# Patient Record
Sex: MALE | Race: WHITE | HISPANIC OR LATINO | Employment: OTHER | URBAN - NONMETROPOLITAN AREA
[De-identification: names, ages, dates, MRNs, and addresses within clinical notes are randomized per-mention and may not be internally consistent; named-entity substitution may affect disease eponyms.]

---

## 2020-07-08 ENCOUNTER — HISTORICAL (OUTPATIENT)
Dept: ADMINISTRATIVE | Facility: HOSPITAL | Age: 80
End: 2020-07-08

## 2020-07-12 LAB — SARS-COV-2 RNA RESP QL NAA+PROBE: NOT DETECTED

## 2021-01-13 ENCOUNTER — IMMUNIZATION (OUTPATIENT)
Dept: OBSTETRICS AND GYNECOLOGY | Facility: CLINIC | Age: 81
End: 2021-01-13
Payer: OTHER GOVERNMENT

## 2021-01-13 DIAGNOSIS — Z23 NEED FOR VACCINATION: ICD-10-CM

## 2021-01-13 PROCEDURE — 91300 COVID-19, MRNA, LNP-S, PF, 30 MCG/0.3 ML DOSE VACCINE: CPT | Mod: PBBFAC

## 2021-02-03 ENCOUNTER — IMMUNIZATION (OUTPATIENT)
Dept: OBSTETRICS AND GYNECOLOGY | Facility: CLINIC | Age: 81
End: 2021-02-03
Payer: OTHER GOVERNMENT

## 2021-02-03 DIAGNOSIS — Z23 NEED FOR VACCINATION: Primary | ICD-10-CM

## 2021-02-03 PROCEDURE — 91300 COVID-19, MRNA, LNP-S, PF, 30 MCG/0.3 ML DOSE VACCINE: CPT | Mod: PBBFAC

## 2021-02-03 PROCEDURE — 0002A COVID-19, MRNA, LNP-S, PF, 30 MCG/0.3 ML DOSE VACCINE: CPT | Mod: PBBFAC

## 2021-09-28 ENCOUNTER — IMMUNIZATION (OUTPATIENT)
Dept: OBSTETRICS AND GYNECOLOGY | Facility: CLINIC | Age: 81
End: 2021-09-28

## 2021-09-28 DIAGNOSIS — Z23 NEED FOR VACCINATION: Primary | ICD-10-CM

## 2021-09-28 PROCEDURE — 91300 COVID-19, MRNA, LNP-S, PF, 30 MCG/0.3 ML DOSE VACCINE: CPT | Mod: PBBFAC

## 2022-04-28 NOTE — DISCHARGE INSTRUCTIONS
BEFORE THE PROCEDURE:    REPORT ANY CHANGE IN YOUR PHYSICAL CONDITION TO YOUR DOCTOR IMMEDIATELY.  SELF ISOLATE AND CHECK TEMPERATURE DAILY, IF TEMP OVER 100, CALL PHYSICIAN IMMEDIATELY.   NO SMOKING OR ALCOHOL FOR 72 HOURS BEFORE YOUR PROCEDURE.   DO NOT EAT OR DRINK ANYTHING AFTER MIDNIGHT THE NIGHT BEFORE YOUR PROCEDURE    THE MORNING OF YOUR PROCEDURE:      YOU  WILL GET A PHONE CALL THE DAY BEFORE YOUR PROCEDURE WITH YOUR APPOINTED TIME   NO MAKE UP OR NAIL POLISH.   ALL MINORS MUST BE ACCOMPANIED BY AN ADULT AT ALL TIMES.     TAKE A SHOWER/BATH THE NIGHT BEFORE YOUR PROCEDURE.     TAKE BLOOD PRESSURE MEDICATIONS THE MORNING OF YOUR PROCEDURE, WITH SMALL SIPS WATER, AS DIRECTED BY YOUR PHYSICIAN.   DO NOT TAKE ANY DIABETIC MEDICATIONS UNLESS DIRECTED TO DO SO BY YOUR PHYSICIAN.   CONTACT LENSES AND DENTURES MUST BE REMOVED.  A RESPONSIBLE ADULT MUST ACCOMPANY YOU HOME UPON DISCHARGE.   ONLY 1 VISITORS ALLOWED PER ROOM.   BRING YOUR EYE DROPS        YOUR THOUGHTS AND OPINIONS HELP US TO BETTER SERVE YOU.     PLEASE PARTICIPATE IN SURVEYS ABOUT YOUR CARE.     THANK YOU FOR CHOOSING [facility].

## 2022-04-29 ENCOUNTER — ANESTHESIA EVENT (OUTPATIENT)
Dept: SURGERY | Facility: HOSPITAL | Age: 82
End: 2022-04-29
Payer: MEDICARE

## 2022-04-29 VITALS — BODY MASS INDEX: 21.03 KG/M2 | WEIGHT: 142 LBS | HEIGHT: 69 IN

## 2022-04-29 PROBLEM — H11.051: Status: ACTIVE | Noted: 2022-04-29

## 2022-04-29 RX ORDER — ATORVASTATIN CALCIUM 40 MG/1
40 TABLET, FILM COATED ORAL DAILY
COMMUNITY

## 2022-04-29 RX ORDER — LISINOPRIL 20 MG/1
20 TABLET ORAL 2 TIMES DAILY
COMMUNITY

## 2022-05-02 ENCOUNTER — HOSPITAL ENCOUNTER (OUTPATIENT)
Dept: PREADMISSION TESTING | Facility: HOSPITAL | Age: 82
Discharge: HOME | End: 2022-05-02
Attending: OPHTHALMOLOGY
Payer: MEDICARE

## 2022-05-03 ENCOUNTER — ANESTHESIA (OUTPATIENT)
Dept: SURGERY | Facility: HOSPITAL | Age: 82
End: 2022-05-03
Payer: MEDICARE

## 2022-05-03 ENCOUNTER — HOSPITAL ENCOUNTER (OUTPATIENT)
Facility: HOSPITAL | Age: 82
Discharge: HOME | End: 2022-05-03
Attending: OPHTHALMOLOGY | Admitting: OPHTHALMOLOGY
Payer: MEDICARE

## 2022-05-03 VITALS
SYSTOLIC BLOOD PRESSURE: 147 MMHG | OXYGEN SATURATION: 98 % | TEMPERATURE: 98 F | RESPIRATION RATE: 18 BRPM | DIASTOLIC BLOOD PRESSURE: 69 MMHG | HEART RATE: 58 BPM

## 2022-05-03 DIAGNOSIS — H11.051 PROGRESSIVE PERIPHERAL PTERYGIUM OF RIGHT EYE: ICD-10-CM

## 2022-05-03 PROCEDURE — 63600175 PHARM REV CODE 636 W HCPCS: Performed by: OPHTHALMOLOGY

## 2022-05-03 PROCEDURE — 63600175 PHARM REV CODE 636 W HCPCS: Performed by: NURSE ANESTHETIST, CERTIFIED REGISTERED

## 2022-05-03 PROCEDURE — 37000008 HC ANESTHESIA 1ST 15 MINUTES: Performed by: OPHTHALMOLOGY

## 2022-05-03 PROCEDURE — 25000242 PHARM REV CODE 250 ALT 637 W/ HCPCS: Performed by: ANESTHESIOLOGY

## 2022-05-03 PROCEDURE — 37000009 HC ANESTHESIA EA ADD 15 MINS: Performed by: OPHTHALMOLOGY

## 2022-05-03 PROCEDURE — 71000015 HC POSTOP RECOV 1ST HR: Performed by: OPHTHALMOLOGY

## 2022-05-03 PROCEDURE — 25000003 PHARM REV CODE 250: Performed by: OPHTHALMOLOGY

## 2022-05-03 PROCEDURE — 25000003 PHARM REV CODE 250

## 2022-05-03 PROCEDURE — V2790 AMNIOTIC MEMBRANE: HCPCS | Performed by: OPHTHALMOLOGY

## 2022-05-03 PROCEDURE — 27201423 OPTIME MED/SURG SUP & DEVICES STERILE SUPPLY: Performed by: OPHTHALMOLOGY

## 2022-05-03 PROCEDURE — 36000707: Performed by: OPHTHALMOLOGY

## 2022-05-03 PROCEDURE — 36000706: Performed by: OPHTHALMOLOGY

## 2022-05-03 RX ORDER — CEFAZOLIN SODIUM 1 G/3ML
INJECTION, POWDER, FOR SOLUTION INTRAMUSCULAR; INTRAVENOUS
Status: DISCONTINUED | OUTPATIENT
Start: 2022-05-03 | End: 2022-05-03 | Stop reason: HOSPADM

## 2022-05-03 RX ORDER — PROPARACAINE HYDROCHLORIDE 5 MG/ML
SOLUTION/ DROPS OPHTHALMIC
Status: DISCONTINUED | OUTPATIENT
Start: 2022-05-03 | End: 2022-05-03 | Stop reason: HOSPADM

## 2022-05-03 RX ORDER — PROPARACAINE HYDROCHLORIDE 5 MG/ML
1 SOLUTION/ DROPS OPHTHALMIC
Status: DISPENSED | OUTPATIENT
Start: 2022-05-03

## 2022-05-03 RX ORDER — HYDRALAZINE HYDROCHLORIDE 20 MG/ML
INJECTION INTRAMUSCULAR; INTRAVENOUS
Status: DISCONTINUED | OUTPATIENT
Start: 2022-05-03 | End: 2022-05-03

## 2022-05-03 RX ORDER — DEXAMETHASONE SODIUM PHOSPHATE 4 MG/ML
2 INJECTION, SOLUTION INTRA-ARTICULAR; INTRALESIONAL; INTRAMUSCULAR; INTRAVENOUS; SOFT TISSUE ONCE
Status: ACTIVE | OUTPATIENT
Start: 2022-05-03

## 2022-05-03 RX ORDER — PREDNISOLONE ACETATE 10 MG/ML
SUSPENSION/ DROPS OPHTHALMIC
Status: DISCONTINUED | OUTPATIENT
Start: 2022-05-03 | End: 2022-05-03 | Stop reason: HOSPADM

## 2022-05-03 RX ORDER — LIDOCAINE HYDROCHLORIDE 10 MG/ML
1 INJECTION, SOLUTION EPIDURAL; INFILTRATION; INTRACAUDAL; PERINEURAL ONCE
Status: ACTIVE | OUTPATIENT
Start: 2022-05-03

## 2022-05-03 RX ORDER — LIDOCAINE HCL/EPINEPHRINE/PF 2%-1:200K
VIAL (ML) INJECTION
Status: DISCONTINUED | OUTPATIENT
Start: 2022-05-03 | End: 2022-05-03 | Stop reason: HOSPADM

## 2022-05-03 RX ORDER — DEXAMETHASONE SODIUM PHOSPHATE 4 MG/ML
INJECTION, SOLUTION INTRA-ARTICULAR; INTRALESIONAL; INTRAMUSCULAR; INTRAVENOUS; SOFT TISSUE
Status: DISCONTINUED | OUTPATIENT
Start: 2022-05-03 | End: 2022-05-03 | Stop reason: HOSPADM

## 2022-05-03 RX ORDER — PREDNISOLONE ACETATE 10 MG/ML
1 SUSPENSION/ DROPS OPHTHALMIC ONCE
Status: ACTIVE | OUTPATIENT
Start: 2022-05-03

## 2022-05-03 RX ORDER — MIDAZOLAM HCL 2 MG/ML
4 SYRUP ORAL ONCE
Status: COMPLETED | OUTPATIENT
Start: 2022-05-03 | End: 2022-05-03

## 2022-05-03 RX ORDER — MOXIFLOXACIN 5 MG/ML
SOLUTION/ DROPS OPHTHALMIC
Status: DISCONTINUED | OUTPATIENT
Start: 2022-05-03 | End: 2022-05-03 | Stop reason: HOSPADM

## 2022-05-03 RX ORDER — MOXIFLOXACIN 5 MG/ML
1 SOLUTION/ DROPS OPHTHALMIC
Status: COMPLETED | OUTPATIENT
Start: 2022-05-03 | End: 2022-05-03

## 2022-05-03 RX ORDER — FENTANYL CITRATE 50 UG/ML
INJECTION, SOLUTION INTRAMUSCULAR; INTRAVENOUS
Status: DISCONTINUED | OUTPATIENT
Start: 2022-05-03 | End: 2022-05-03

## 2022-05-03 RX ORDER — NEOMYCIN SULFATE, POLYMYXIN B SULFATE, AND DEXAMETHASONE 3.5; 10000; 1 MG/G; [USP'U]/G; MG/G
1 OINTMENT OPHTHALMIC ONCE
Status: ACTIVE | OUTPATIENT
Start: 2022-05-03

## 2022-05-03 RX ORDER — NEOMYCIN SULFATE, POLYMYXIN B SULFATE, AND DEXAMETHASONE 3.5; 10000; 1 MG/G; [USP'U]/G; MG/G
OINTMENT OPHTHALMIC
Status: DISCONTINUED | OUTPATIENT
Start: 2022-05-03 | End: 2022-05-03 | Stop reason: HOSPADM

## 2022-05-03 RX ADMIN — FENTANYL CITRATE 50 MCG: 50 INJECTION INTRAMUSCULAR; INTRAVENOUS at 12:05

## 2022-05-03 RX ADMIN — HYDRALAZINE HYDROCHLORIDE 5 MG: 20 INJECTION INTRAMUSCULAR; INTRAVENOUS at 12:05

## 2022-05-03 RX ADMIN — MOXIFLOXACIN 1 DROP: 5 SOLUTION/ DROPS OPHTHALMIC at 09:05

## 2022-05-03 RX ADMIN — PROPARACAINE HYDROCHLORIDE 1 DROP: 5 SOLUTION/ DROPS OPHTHALMIC at 09:05

## 2022-05-03 RX ADMIN — MIDAZOLAM HYDROCHLORIDE 4 MG: 2 SYRUP ORAL at 11:05

## 2022-05-03 NOTE — DISCHARGE INSTRUCTIONS
-RELAX AT HOME FOR THE REST OF THE DAY. YOU MAY EAT ANYTHING YOU LIKE.   -PLAN TO SEE DR TAPIA TOMORROW AT THE OFFICE. BRING ALL EYE DROPS WITH YOU TO THIS APPOINTMENT.    -PLAN TO WEAR EYE SHIELD UNTIL YOUR APPOINTMENT WITH DR TAPIA    -STARTING THE DAY AFTER SURGERY:    -PUT EYE DROPS/OINTMENT IN THE AFFECTED EYE AS PER DR TAPIA'S EYE DROP SCHEDULED.     -IF YOU HAVE EXCESSIVE PAIN OR DECREASED VISION, CALL DR TAPIA'S OFFICE -[phone] OR IF AFTER HOURS CALL [phone].

## 2022-05-03 NOTE — TRANSFER OF CARE
Anesthesia Transfer of Care Note    Patient: Colt Moreno    Procedure(s) Performed: Procedure(s) (LRB):  EXCISION, PTERYGIUM (Right)    Patient location: Other: OR    Anesthesia Type: general and MAC    Transport from OR: Transported from OR on room air with adequate spontaneous ventilation    Post pain: adequate analgesia    Post assessment: no apparent anesthetic complications    Post vital signs: stable    Level of consciousness: awake    Nausea/Vomiting: no nausea/vomiting    Complications: none    Transfer of care protocol was followed      Last vitals:   177/90  16 RR  57 HR  99% O2 SAT  37 C TEMP

## 2022-05-03 NOTE — DISCHARGE SUMMARY
[facility]  Brief Discharge Note      Patient Name:  Colt Moreno   MRN:  [id number]    :  1940   Admission Date:  5/3/2022   Discharge Date:  5/3/2022   Attending Physician: Jimy Lucero MD     Procedure:  Pterygium Removal with Mitomycin C and Amniotic MembraneTransplant (Right)    OUTCOME: Patient tolerated procedure well without complication and is now ready for discharge.    DISPOSITION: Home or Self Care    FINAL DIAGNOSIS:  Peripheral Progressive Pterygium, Right Eye    FOLLOWUP: 1 day in clinic    DISCHARGE INSTRUCTIONS:  Wear eye shield until your schedule appointment with doctor.

## 2022-05-03 NOTE — INTERVAL H&P NOTE
The patient has been examined and the H&P has been reviewed:    I concur with the findings and no changes have occurred since H&P was written.    Surgery risks, benefits and alternative options discussed and understood by patient/family.          Active Hospital Problems    Diagnosis  POA    *Peripheral pterygium, progressive, right eye [H11.051]  Yes      Resolved Hospital Problems   No resolved problems to display.

## 2022-05-03 NOTE — OP NOTE
[facility]  Operative Note    Date:  5/3/2022     Patient:  Colt Moreno   MRN:  [id number]   :  1940    Surgeon:  Jimy Lucero MD    PREOPERATIVE DIAGNOSIS:  Visually significant peripheral progressive pterygium, right eye.    POSTOPERATIVE DIAGNOSIS:  Visually significant peripheral progressive pterygium, right eye.    PROCEDURE:  Pterygium removal with Mitomycin C and Amniotic Membrane Transplant, right eye    ANESTHESIA:  Topical, local, MAC.      COMPLICATIONS:  None.    ESTIMATED BLOOD LOSS:  Minimal.    PROCEDURE IN DETAIL:  The patient presented to our clinic complaining of increasing difficulties with activities of daily living due to a visually significant peripheral progressive pterygium in the right eye.  After risks, benefits, and alternatives were explained to the patient, the patient agreed to proceed with the above procedure.  The patient was brought to the operating room and received topical anesthetic to the right eye and then prepped and draped in the usual sterile fashion.  The patient was asked to look the right as the nasal conjunctiva was dried with a Weck-Josefina.  The conjunctival portion of the pterygium was outlined using a sterile marker.  Next, an injection of lidocaine 2% with epinephrine was given subconjunctivally nasally.  0.12 forceps and blunt Maryellen scissors were used to dissect out the conjunctival portion of the pterygium.  The corneal portion of the pterygium was then gently pealed off with conjunctival forceps and a dry Weck-Josefina.  The specimen was sent to Pathology.  The nasal cornea was smoothed using a michael grace.  Blunt dissection was performed subconjunctivally and hemostasis was achieved on the episclera using eraser cautery.  3 pledgets soaked in Mitomycin C 0.04% were placed subconjunctivally nasally for 2 minutes exactly, and then removed.  Immediately after this, 3 bottles of BSS were used to thoroughly rinse the area.  Next, the amniotic membrane  was cut to size to fill the conjunctival defect.  The amniotic membrane transplant was placed on the episclera and retracted nasally.  Thrombin and Fibrinogen were placed on the episclera, followed by placement of the amniotic membrane transplant.  The transplant was smoothed using a muscle hook and its edges were tucked subconjunctivally.  An injection of Decadron and Ancef was given subconjunctivally inferiorly.  Vigamox, Pred Forte, as well as Maxitrol ointment were placed in the eye followed by placement of a patch and G[place]a shield.  The patient tolerated the procedure well and was taken to the recovery room in good and stable condition.  The patient was instructed to keep the patch and shield on at all times and to follow-up in the morning for routine post-operative care with Dr. Jimy Lucero.

## 2022-05-04 NOTE — ANESTHESIA POSTPROCEDURE EVALUATION
Anesthesia Post Evaluation    Patient: Colt Moreno    Procedure(s) Performed: Procedure(s) (LRB):  EXCISION, PTERYGIUM (Right)    Final Anesthesia Type: MAC      Patient location during evaluation: OPS  Patient participation: Yes- Able to Participate  Level of consciousness: awake  Post-procedure vital signs: reviewed and stable  Pain management: adequate  Airway patency: patent    PONV status at discharge: No PONV  Anesthetic complications: no      Cardiovascular status: blood pressure returned to baseline  Respiratory status: spontaneous ventilation  Hydration status: euvolemic  Follow-up not needed.          Vitals Value Taken Time   /69 05/03/22 1321   Temp 36.6 °C (97.9 °F) 05/03/22 1321   Pulse 58 05/03/22 1321   Resp 18 05/03/22 1321   SpO2 98 % 05/03/22 1321         No case tracking events are documented in the log.      Pain/Petrona Score: Petrona Score: 10 (5/3/2022  1:21 PM)

## 2022-05-06 ENCOUNTER — IMMUNIZATION (OUTPATIENT)
Dept: PRIMARY CARE CLINIC | Facility: CLINIC | Age: 82
End: 2022-05-06
Payer: MEDICARE

## 2022-05-06 DIAGNOSIS — Z23 NEED FOR VACCINATION: Primary | ICD-10-CM

## 2022-05-06 PROCEDURE — 91305 COVID-19, MRNA, LNP-S, PF, 30 MCG/0.3 ML DOSE VACCINE (PFIZER): CPT | Mod: PBBFAC,PN

## 2022-05-12 LAB — SPECIMEN TO PATHOLOGY - SURGICAL: NORMAL

## 2022-09-06 NOTE — ANESTHESIA PREPROCEDURE EVALUATION
04/29/2022  Colt Moreno is a 81 y.o., male.      Pre-op Assessment    I have reviewed the Patient Summary Reports.    I have reviewed the NPO Status.   I have reviewed the Medications.     Review of Systems  Anesthesia Hx:  No problems with previous Anesthesia  Denies Family Hx of Anesthesia complications.   Denies Personal Hx of Anesthesia complications.   Social:  Non-Smoker    Hematology/Oncology:         -- Cancer in past history: chemotherapy and radiation  Oncology Comments: tongue     Cardiovascular:   Hypertension, well controlled    Pulmonary:  Pulmonary Normal    Renal/:  Renal/ Normal     Hepatic/GI:  Hepatic/GI Normal    Neurological:  Neurology Normal    Endocrine:  Endocrine Normal        Physical Exam  General: Well nourished    Airway:  Mallampati: II / II  Mouth Opening: Normal  TM Distance: Normal  Tongue: Normal  Neck ROM: Normal ROM    Dental:  Intact    Chest/Lungs:  Clear to auscultation    Heart:  Rate: Normal  Rhythm: Regular Rhythm  Sounds: Normal        Anesthesia Plan  Type of Anesthesia, risks & benefits discussed:    Anesthesia Type: MAC  Intra-op Monitoring Plan: Standard ASA Monitors  Post Op Pain Control Plan: multimodal analgesia  Induction:  IV  Airway Plan: Direct  Informed Consent: Informed consent signed with the Patient and all parties understand the risks and agree with anesthesia plan.  All questions answered.   ASA Score: 2  Day of Surgery Review of History & Physical: I have interviewed and examined the patient. I have reviewed the patient's H&P dated: There are no significant changes.     Ready For Surgery From Anesthesia Perspective.     .       no

## 2022-09-16 ENCOUNTER — IMMUNIZATION (OUTPATIENT)
Dept: PRIMARY CARE CLINIC | Facility: CLINIC | Age: 82
End: 2022-09-16
Payer: MEDICARE

## 2022-09-16 DIAGNOSIS — Z23 NEED FOR VACCINATION: Primary | ICD-10-CM

## 2022-09-16 PROCEDURE — 0124A COVID-19, MRNA, LNP-S, BIVALENT BOOSTER, PF, 30 MCG/0.3 ML DOSE: CPT | Mod: PBBFAC,PN

## 2022-09-16 PROCEDURE — 91312 COVID-19, MRNA, LNP-S, BIVALENT BOOSTER, PF, 30 MCG/0.3 ML DOSE: CPT | Mod: PBBFAC,PN

## 2023-10-20 ENCOUNTER — TELEPHONE (OUTPATIENT)
Dept: HEMATOLOGY/ONCOLOGY | Facility: CLINIC | Age: 83
End: 2023-10-20
Payer: MEDICARE

## 2023-10-20 NOTE — TELEPHONE ENCOUNTER
----- Message from Gaby Melendez RN sent at 10/18/2023 11:46 AM CDT -----  Regarding: FW: Patient advice  Contact: Pt [phone]  For you  ----- Message -----  From: Gela Meyer  Sent: 10/18/2023  11:44 AM CDT  To: [facility] Cancer Navigation  Subject: Patient advice                                             Name of Caller:  Letty     Contact Preference:  [phone]     Nature of Call: Pt wife called to get information on how to transfer care to MD Tap[facility] at [facility] is currently a pt at [facility]

## 2023-11-08 NOTE — TELEPHONE ENCOUNTER
Spoke with pt's wife & arranged consults for 12/4/23 with ENT surgery & medical oncology. Will also mail appt letters.

## 2023-12-04 ENCOUNTER — HOSPITAL ENCOUNTER (OUTPATIENT)
Dept: RADIOLOGY | Facility: HOSPITAL | Age: 83
Discharge: HOME | End: 2023-12-04
Attending: OTOLARYNGOLOGY
Payer: MEDICARE

## 2023-12-04 ENCOUNTER — OFFICE VISIT (OUTPATIENT)
Dept: OTOLARYNGOLOGY | Facility: CLINIC | Age: 83
End: 2023-12-04
Payer: MEDICARE

## 2023-12-04 VITALS
WEIGHT: 142.19 LBS | BODY MASS INDEX: 21.31 KG/M2 | SYSTOLIC BLOOD PRESSURE: 186 MMHG | DIASTOLIC BLOOD PRESSURE: 93 MMHG | HEART RATE: 59 BPM

## 2023-12-04 DIAGNOSIS — C01 PRIMARY SQUAMOUS CELL CARCINOMA OF BASE OF TONGUE: ICD-10-CM

## 2023-12-04 DIAGNOSIS — C79.89 SECONDARY MALIGNANT NEOPLASM OF OTHER SPECIFIED SITES: ICD-10-CM

## 2023-12-04 DIAGNOSIS — C01 PRIMARY SQUAMOUS CELL CARCINOMA OF BASE OF TONGUE: Primary | ICD-10-CM

## 2023-12-04 DIAGNOSIS — R22.1 LOCALIZED SWELLING, MASS AND LUMP, NECK: ICD-10-CM

## 2023-12-04 PROCEDURE — 99999 PR PBB SHADOW E&M-EST. PATIENT-LVL III: ICD-10-PCS | Mod: PBBFAC,,, | Performed by: OTOLARYNGOLOGY

## 2023-12-04 PROCEDURE — 99204 PR OFFICE/OUTPT VISIT, NEW, LEVL IV, 45-59 MIN: ICD-10-PCS | Mod: 25,S$PBB,, | Performed by: OTOLARYNGOLOGY

## 2023-12-04 PROCEDURE — 99213 OFFICE O/P EST LOW 20 MIN: CPT | Mod: PBBFAC,25 | Performed by: OTOLARYNGOLOGY

## 2023-12-04 PROCEDURE — 99999 PR PBB SHADOW E&M-EST. PATIENT-LVL III: CPT | Mod: PBBFAC,,, | Performed by: OTOLARYNGOLOGY

## 2023-12-04 PROCEDURE — 31575 DIAGNOSTIC LARYNGOSCOPY: CPT | Mod: S$PBB,,, | Performed by: OTOLARYNGOLOGY

## 2023-12-04 PROCEDURE — 31575 PR LARYNGOSCOPY, FLEXIBLE; DIAGNOSTIC: ICD-10-PCS | Mod: S$PBB,,, | Performed by: OTOLARYNGOLOGY

## 2023-12-04 PROCEDURE — 71046 X-RAY EXAM CHEST 2 VIEWS: CPT | Mod: TC

## 2023-12-04 PROCEDURE — 71046 X-RAY EXAM CHEST 2 VIEWS: CPT | Mod: 26,,, | Performed by: RADIOLOGY

## 2023-12-04 PROCEDURE — 99204 OFFICE O/P NEW MOD 45 MIN: CPT | Mod: 25,S$PBB,, | Performed by: OTOLARYNGOLOGY

## 2023-12-04 PROCEDURE — 71046 XR CHEST PA AND LATERAL: ICD-10-PCS | Mod: 26,,, | Performed by: RADIOLOGY

## 2023-12-04 PROCEDURE — 31575 DIAGNOSTIC LARYNGOSCOPY: CPT | Mod: PBBFAC | Performed by: OTOLARYNGOLOGY

## 2023-12-06 DIAGNOSIS — E03.9 HYPOTHYROIDISM, UNSPECIFIED TYPE: Primary | ICD-10-CM

## 2023-12-06 RX ORDER — LEVOTHYROXINE SODIUM 25 UG/1
12.5 TABLET ORAL
Qty: 15 TABLET | Refills: 3 | Status: SHIPPED | OUTPATIENT
Start: 2023-12-06 | End: 2023-12-11 | Stop reason: SDUPTHER

## 2023-12-06 NOTE — PROGRESS NOTES
Chief Complaint   Patient presents with    H&N Cancer [facility] care from [facility]         83 y.o. 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 He was worked up by his local physicians with a CT scan of the head and neck on 12/15/2011 which demonstrated a 2.5 cm lymph node in the left internal jugular region as well as a 1.2 cm lymph node superior to that node.    1/19/2012 [place]-Surgery  Left base of tongue (1):  Squamous mucosa with minute focus of mild dysplasia    Right posterior tongue, biopsy (2):  Poorly differentiated squamoid focus, can not exclude squamous carcinoma    Left posterior tongue, biopsy (3);  Squamous mucosa and submucosal lymphoid nodules, no carcinoma seen    Left posterior tongue, biopsy (4):  INVASIVE SQUAMOUS CARCINOMA -- Poorly differentiated with a depth of invasion at least 3 mm  Perineural Invasion: Not Evaluable    1/29/2012 DX-Biopsy  left neck lymph node, fine needle aspiration:  SQUAMOUS CELL CARCINOMA, POORLY DIFFERENTIATED    3/2012 - 4/2012 TX-Radiation Therapy  70 Gy in 33 fractions concurrent with weekly cisplatin    3/2012 - 4/2012 TX-Chemotherapy/Biotherapy/Investigational/SMI  Concurrent with radiation therapy; weekly cisplatin    No current issues. No new neck masses, denies throat pain, otalgia, voice changes or dysphagia.    Review of Systems     Constitutional: Negative for fatigue and unexpected weight change.   HENT: per HPI.  Eyes: Negative for visual disturbance.   Respiratory: Negative for shortness of breath, hemoptysis  Cardiovascular: Negative for chest pain and palpitations.   Genitourinary: Negative for dysuria and difficulty urinating.   Musculoskeletal: Negative for decreased ROM, back pain.   Skin: Negative for rash, sunburn, itching.   Neurological: Negative for dizziness and seizures.   Hematological: Negative  for adenopathy. Does not bruise/bleed easily.   Psychiatric/Behavioral: Negative for agitation. The patient is not nervous/anxious.   Endocrine: Negative for rapid weight loss/weight gain, heat/cold intolerance.     Past Medical History:   Diagnosis Date    Cancer     Glaucoma     History of chemotherapy     History of therapeutic radiation     Hypertension     Pterygium eye, right     Squamous cell carcinoma of tongue        Past Surgical History:   Procedure Laterality Date    EXCISION OF PTERYGIUM Right 5/3/2022    Procedure: EXCISION, PTERYGIUM;  Surgeon: Jimy Lucero MD;  Location: [facility];  Service: Ophthalmology;  Laterality: Right;  7th - 0830    TOTAL KNEE ARTHROPLASTY  10/2018       family history includes Cancer in his sister; No Known Problems in his father and mother.    Pt  reports that he has never smoked. He has never used smokeless tobacco. He reports that he does not currently use alcohol. He reports that he does not use drugs.    Review of patient's allergies indicates:  No Known Allergies     Physical Exam    Vitals:    12/04/23 1333   BP: (!) 186/93   Pulse: (!) 59     Body mass index is 21.31 kg/m².    Physical Exam  Vitals and nursing note reviewed.   Constitutional:       General: He is not in acute distress.     Appearance: He is well-developed. He is not diaphoretic.   HENT:      Head: Normocephalic and atraumatic.      Right Ear: Hearing and external ear normal. No decreased hearing noted.      Left Ear: Hearing and external ear normal. No decreased hearing noted.      Nose: Nose normal.      Mouth/Throat:      Mouth: Mucous membranes are moist. No oral lesions.      Tongue: No lesions.      Palate: No mass.      Pharynx: Oropharynx is clear. Uvula midline.   Eyes:      General: Lids are normal.         Right eye: No discharge.         Left eye: No discharge.      Conjunctiva/sclera: Conjunctivae normal.      Pupils: Pupils are equal, round, and reactive to light.   Neck:       Thyroid: No thyroid mass or thyromegaly.      Trachea: Trachea and phonation normal. No tracheal tenderness or tracheal deviation.      Comments: Woody neck changes  Cardiovascular:      Heart sounds: Normal heart sounds.   Pulmonary:      Breath sounds: Normal breath sounds. No stridor.   Abdominal:      Palpations: Abdomen is soft.   Musculoskeletal:      Cervical back: Normal range of motion and neck supple. No edema or erythema.   Lymphadenopathy:      Cervical: No cervical adenopathy.   Skin:     General: Skin is warm and dry.      Coloration: Skin is not pale.      Findings: No erythema or rash.   Neurological:      Mental Status: He is alert and oriented to person, place, and time.        Procedure: Flexible laryngoscopy  In order to fully examine the upper aerodigestive tract, including the larynx, in a patient with a hyperactive gag reflex, flexible endoscopy is required.  After explaining the procedure and obtaining verbal consent, a timeout was performed with the patient's participation according to the universal protocol. Both nasal cavities were anesthetized with 4% Xylocaine spray mixed with Jermaine-Synephrine. The flexible laryngoscope was inserted into the nasal cavity and advanced to visualize the nasal cavity, nasopharynx, the posterior oropharynx, hypopharynx, and the endolarynx with the above findings noted. The scope was removed and the procedure terminated. The patient tolerated this procedure well without apparent complication.      Nasopharynx - the torus is clear. There are no lesions of the posterior wall.   Oropharynx - no lesions of the tongue base. There is no obvious fullness or asymmetry.  Hypopharynx - there are no lesions of the pyriform sinuses or postcricoid region   Larynx - there are no lesions of the supraglottic or glottic larynx. Vocal fold mobility is normal with complete closure.      Assessment     1. Primary squamous cell carcinoma of base of tongue    2. Secondary malignant  neoplasm of other specified sites    3. Localized swelling, mass and lump, neck          Plan  In summary, Mr. Moreno is an 83 year old male with history of [id number] P16+ squamous cell carcinoma of the left BOT s/p chemo/xrt completed 4/2012. FAVIO. Doing well. No issues today. TSH, CXR, and carotid doppler ordered. Follow up with PCP as scheduled. Return to clinic in one year or sooner as needed for surveillance.

## 2023-12-07 ENCOUNTER — TELEPHONE (OUTPATIENT)
Dept: HEMATOLOGY/ONCOLOGY | Facility: CLINIC | Age: 83
End: 2023-12-07
Payer: MEDICARE

## 2023-12-11 ENCOUNTER — OFFICE VISIT (OUTPATIENT)
Dept: HEMATOLOGY/ONCOLOGY | Facility: CLINIC | Age: 83
End: 2023-12-11
Payer: MEDICARE

## 2023-12-11 VITALS
BODY MASS INDEX: 21.44 KG/M2 | DIASTOLIC BLOOD PRESSURE: 120 MMHG | WEIGHT: 143.06 LBS | SYSTOLIC BLOOD PRESSURE: 210 MMHG | HEART RATE: 59 BPM | TEMPERATURE: 98 F | OXYGEN SATURATION: 99 %

## 2023-12-11 DIAGNOSIS — Z85.89 ENCOUNTER FOR FOLLOW-UP SURVEILLANCE OF HEAD AND NECK CANCER: Primary | ICD-10-CM

## 2023-12-11 DIAGNOSIS — Z08 ENCOUNTER FOR FOLLOW-UP SURVEILLANCE OF HEAD AND NECK CANCER: Primary | ICD-10-CM

## 2023-12-11 DIAGNOSIS — E03.9 HYPOTHYROIDISM (ACQUIRED): ICD-10-CM

## 2023-12-11 PROCEDURE — 99213 OFFICE O/P EST LOW 20 MIN: CPT | Mod: PBBFAC | Performed by: INTERNAL MEDICINE

## 2023-12-11 PROCEDURE — 99203 OFFICE O/P NEW LOW 30 MIN: CPT | Mod: S$PBB,,, | Performed by: INTERNAL MEDICINE

## 2023-12-11 PROCEDURE — 99203 PR OFFICE/OUTPT VISIT, NEW, LEVL III, 30-44 MIN: ICD-10-PCS | Mod: S$PBB,,, | Performed by: INTERNAL MEDICINE

## 2023-12-11 PROCEDURE — 99999 PR PBB SHADOW E&M-EST. PATIENT-LVL III: CPT | Mod: PBBFAC,,, | Performed by: INTERNAL MEDICINE

## 2023-12-11 PROCEDURE — 99999 PR PBB SHADOW E&M-EST. PATIENT-LVL III: ICD-10-PCS | Mod: PBBFAC,,, | Performed by: INTERNAL MEDICINE

## 2023-12-11 RX ORDER — LEVOTHYROXINE SODIUM 13 UG/1
13 CAPSULE ORAL
Qty: 30 TABLET | Refills: 1 | Status: SHIPPED | OUTPATIENT
Start: 2023-12-11 | End: 2024-12-10

## 2023-12-13 PROBLEM — Z08 ENCOUNTER FOR FOLLOW-UP SURVEILLANCE OF HEAD AND NECK CANCER: Status: ACTIVE | Noted: 2023-12-13

## 2023-12-13 PROBLEM — Z85.89 ENCOUNTER FOR FOLLOW-UP SURVEILLANCE OF HEAD AND NECK CANCER: Status: ACTIVE | Noted: 2023-12-13

## 2023-12-13 NOTE — PROGRESS NOTES
"PATIENT: Colt Moreno  MRN: [id number]  DATE: 12/13/2023    Diagnosis:   1. Encounter for follow-up surveillance of head and neck cancer    2. Hypothyroidism (acquired)        Chief Complaint: Establish Care and head and neck cancer      Oncologic History:   Copied from past cancer survivorship clinic note at [facility] dated 10/7/22:    12/15/2010 Initial Diagnosis   In 12/2010, he noticed a lump in his left neck. He was worked up by his local physicians with a CT scan of the head and neck on 12/15/2011 which demonstrated a 2.5 cm lymph node in the left internal jugular region as well as a 1.2 cm lymph node superior to that node.    1/19/2012 [place]-Surgery   Left base of tongue (1):  Squamous mucosa with minute focus of mild dysplasia    Right posterior tongue, biopsy (2):  Poorly differentiated squamoid focus, can not exclude squamous carcinoma    Left posterior tongue, biopsy (3);  Squamous mucosa and submucosal lymphoid nodules, no carcinoma seen    Left posterior tongue, biopsy (4):  INVASIVE SQUAMOUS CARCINOMA -- Poorly differentiated with a depth of invasion at least 3 mm  Perineural Invasion: Not Evaluable     1/29/2012 DX-Biopsy   "left neck lymph node", fine needle aspiration:  SQUAMOUS CELL CARCINOMA, POORLY DIFFERENTIATED    3/2012 - 4/2012 TX-Radiation Therapy   70 Gy in 33 fractions concurrent with weekly cisplatin    3/2012 - 4/2012 TX-Chemotherapy/Biotherapy/Investigational/SMI   Concurrent with radiation therapy; weekly cisplatin     7/2014 - Status-Eligible for Survivorship         He has since been followed with serial clinic visits and imaging and has been found to be without evidence of recurent or residual disease. For this reason, Ran Moreno was transitioned into the Head & Neck Survivorship Clinic.              Subjective:    History of Present Illness: Mr. Moreno is a 83 y.o. male who presents to establish local care with medical oncology for head and neck cancer survivorship. He was treated in 2012 " and has remained FAVIO since that time. He is feeling well and denies acute complaints. Appetite has been normal. No dysphagia. Occasional/mild xerostomia however not bothersome enough that he wishes to try and medical intervention. He saw Dr. Kevin last week and a flexible laryngoscopic exam showed FAVIO.     Past medical, surgical, family, and social histories have been reviewed and updated below.    Past Medical History:   Past Medical History:   Diagnosis Date    Cancer     Glaucoma     History of chemotherapy     History of therapeutic radiation     Hypertension     Pterygium eye, right     Squamous cell carcinoma of tongue        Past Surgical History:   Past Surgical History:   Procedure Laterality Date    EXCISION OF PTERYGIUM Right 5/3/2022    Procedure: EXCISION, PTERYGIUM;  Surgeon: Jimy Lucero MD;  Location: [facility];  Service: Ophthalmology;  Laterality: Right;  7th - 0830    TOTAL KNEE ARTHROPLASTY  10/2018       Family History:   Family History   Problem Relation Age of Onset    No Known Problems Mother     No Known Problems Father     Cancer Sister        Social History:  reports that he has never smoked. He has never used smokeless tobacco. He reports that he does not currently use alcohol. He reports that he does not use drugs.    Allergies:  Review of patient's allergies indicates:  No Known Allergies    Medications:  Current Outpatient Medications   Medication Sig Dispense Refill    atorvastatin (LIPITOR) 40 MG tablet Take 40 mg by mouth once daily.      latanoprost 0.005 % dpem Apply 1 drop to eye once daily at 6am.      levothyroxine 13 mcg Cap Take 13 mcg by mouth before breakfast. 30 tablet 1    lisinopriL (PRINIVIL,ZESTRIL) 20 MG tablet Take 20 mg by mouth 2 (two) times a day.      multivitamin capsule Take 1 capsule by mouth once daily.       No current facility-administered medications for this visit.     Facility-Administered Medications Ordered in Other Visits   Medication Dose  Route Frequency Provider Last Rate Last Admin    ceFAZolin (ANCEF) 100 mg in sterile water for injection 0.5 mL subconjunctival soln (conc: 100 mg/0.5 mL)  100 mg Subconjunctival Once Jimy Lucero MD        dexamethasone injection 2 mg  2 mg Other Once Jimy Lucero MD        LIDOcaine (PF) 10 mg/ml (1%) injection 10 mg  1 mL Intradermal Once Jimy Lucero MD        mitoMYcin Kit 0.2 mg  0.2 mg Right Eye Once Jimy Lucero MD        neomycin-polymyxin-dexamethasone ophthalmic ointment 1 application  1 application  Right Eye Once Jimy Lucero MD        prednisoLONE acetate 1 % ophthalmic suspension 1 drop  1 drop Right Eye Once Jimy Lucero MD        proparacaine 0.5 % ophthalmic solution 1 drop  1 drop Right Eye Q5 Min Jimy Lucero MD   1 drop at 05/03/22 0915       Review of Systems  A comprehensive review of systems was performed; pertinent positives and negatives are noted in the HPI.      ECOG Performance Status:   ECOG SCORE    1 - Restricted in strenuous activity-ambulatory and able to carry out work of a light nature         Objective:      Vitals:   Vitals:    12/11/23 1037   BP: (!) 210/120   Pulse: (!) 59   Temp: 98.1 °F (36.7 °C)   SpO2: 99%   Weight: 64.9 kg (143 lb 1.3 oz)     BMI: Body mass index is 21.44 kg/m².    Physical Exam  Vitals and nursing note reviewed.   Constitutional:       General: He is not in acute distress.     Appearance: Normal appearance. He is normal weight. He is not toxic-appearing.   HENT:      Head: Normocephalic and atraumatic.   Eyes:      General: No scleral icterus.     Conjunctiva/sclera: Conjunctivae normal.   Cardiovascular:      Rate and Rhythm: Normal rate and regular rhythm.      Heart sounds: Normal heart sounds.   Pulmonary:      Effort: Pulmonary effort is normal.      Breath sounds: Normal breath sounds. No wheezing, rhonchi or rales.   Abdominal:      General: Abdomen is flat. Bowel sounds are normal.      Palpations:  Abdomen is soft.      Tenderness: There is no abdominal tenderness.   Musculoskeletal:         General: No tenderness or deformity.      Cervical back: Neck supple.   Lymphadenopathy:      Cervical: No cervical adenopathy.   Skin:     General: Skin is warm and dry.      Coloration: Skin is not jaundiced.      Findings: No bruising or erythema.   Neurological:      General: No focal deficit present.      Mental Status: He is alert and oriented to person, place, and time. Mental status is at baseline.   Psychiatric:         Mood and Affect: Mood normal.         Behavior: Behavior normal.         Thought Content: Thought content normal.         Laboratory Data:  Labs have been reviewed.          Assessment:       1. Encounter for follow-up surveillance of head and neck cancer    2. Hypothyroidism (acquired)      Very pleasant 82yo gentleman with history of T2 N2 M0 p16+ squamous cell carcinoma of base of tongue. Treated with definitive CRT in 2012, has been FAVIO since that time.       Plan:     Doing well currently with no signs or symptoms to suggest relapse. Saw ENT last week and flexible laryngoscopic exam with FAVIO.  Continue annual survivorship visits and follow-up with ENT for recommended surveillance exams.        Patrick Garner MD, FACP  Hematology/Oncology  [facility] [address], [address]  [place] [address]  Phone: [phone]  Fax: [phone]    Total time of this visit, including time spent face to face with patient and/or via video/audio, and also in preparing for today's visit for MDM and documentation. (Medical Decision Making, including consideration of possible diagnoses, management options, complex medical record review, review of diagnostic tests and information, consideration and discussion of significant complications based on comorbidities, and discussion with providers involved with the care of the patient) 31 minutes. Greater than 50% was spent face  to face with the patient counseling and coordinating care.

## 2023-12-24 ENCOUNTER — HOSPITAL ENCOUNTER (EMERGENCY)
Facility: HOSPITAL | Age: 83
Discharge: HOME | End: 2023-12-24
Attending: EMERGENCY MEDICINE
Payer: MEDICARE

## 2023-12-24 VITALS
HEIGHT: 68 IN | HEART RATE: 62 BPM | TEMPERATURE: 98 F | SYSTOLIC BLOOD PRESSURE: 184 MMHG | OXYGEN SATURATION: 98 % | RESPIRATION RATE: 18 BRPM | BODY MASS INDEX: 21.67 KG/M2 | DIASTOLIC BLOOD PRESSURE: 91 MMHG | WEIGHT: 143 LBS

## 2023-12-24 DIAGNOSIS — M10.9 ACUTE GOUT INVOLVING TOE OF RIGHT FOOT, UNSPECIFIED CAUSE: Primary | ICD-10-CM

## 2023-12-24 DIAGNOSIS — I10 PRIMARY HYPERTENSION: ICD-10-CM

## 2023-12-24 PROCEDURE — 99284 EMERGENCY DEPT VISIT MOD MDM: CPT

## 2023-12-24 PROCEDURE — 96372 THER/PROPH/DIAG INJ SC/IM: CPT | Performed by: EMERGENCY MEDICINE

## 2023-12-24 PROCEDURE — 63600175 PHARM REV CODE 636 W HCPCS: Performed by: EMERGENCY MEDICINE

## 2023-12-24 RX ORDER — KETOROLAC TROMETHAMINE 30 MG/ML
15 INJECTION, SOLUTION INTRAMUSCULAR; INTRAVENOUS
Status: COMPLETED | OUTPATIENT
Start: 2023-12-24 | End: 2023-12-24

## 2023-12-24 RX ORDER — CLONIDINE 0.3 MG/24H
1 PATCH, EXTENDED RELEASE TRANSDERMAL
COMMUNITY
Start: 2023-12-12

## 2023-12-24 RX ADMIN — KETOROLAC TROMETHAMINE 15 MG: 30 INJECTION, SOLUTION INTRAMUSCULAR; INTRAVENOUS at 09:12

## 2023-12-24 NOTE — ED PROVIDER NOTES
EMERGENCY DEPARTMENT HISTORY AND PHYSICAL EXAM     This note is dictated on M*Modal word recognition program.  There are word recognition mistakes and grammatical errors that are occasionally missed on review.     Date: 12/24/2023   Patient Name: Colt Moreno       History of Presenting Illness      Chief Complaint   Patient presents with    Foot Problem     Complains of non traumatic pain and swelling to right great toe, acute onset at 0330 this morning. Woke him up from sleep.            Colt Moreno is a 83 y.o. male with PMHX of hypertension, hypothyroid, gout who presents to the emergency department C/O right toe pain.    Patient reports right toe pain that began overnight.  No trauma.  Patient took a Mobic last night.  Previously had episode of what he suspects is gout in his right wrist.      PCP: Wilma Lucero MD        No current facility-administered medications for this encounter.     Current Outpatient Medications   Medication Sig Dispense Refill    cloNIDine 0.3 mg/24 hr td ptwk (CATAPRES) 0.3 mg/24 hr Place 1 patch onto the skin every 7 days.      atorvastatin (LIPITOR) 40 MG tablet Take 40 mg by mouth once daily.      latanoprost 0.005 % dpem Apply 1 drop to eye once daily at 6am.      levothyroxine 13 mcg Cap Take 13 mcg by mouth before breakfast. 30 tablet 1    lisinopriL (PRINIVIL,ZESTRIL) 20 MG tablet Take 20 mg by mouth 2 (two) times a day.      multivitamin capsule Take 1 capsule by mouth once daily.       Facility-Administered Medications Ordered in Other Encounters   Medication Dose Route Frequency Provider Last Rate Last Admin    ceFAZolin (ANCEF) 100 mg in sterile water for injection 0.5 mL subconjunctival soln (conc: 100 mg/0.5 mL)  100 mg Subconjunctival Once Jimy Lucero MD        dexamethasone injection 2 mg  2 mg Other Once iJmy Lucero MD        LIDOcaine (PF) 10 mg/ml (1%) injection 10 mg  1 mL Intradermal Once Jimy Lucero MD        mitoMYcin Kit 0.2 mg   0.2 mg Right Eye Once Jimy Lucero MD        neomycin-polymyxin-dexamethasone ophthalmic ointment 1 application  1 application  Right Eye Once Jimy Lucero MD        prednisoLONE acetate 1 % ophthalmic suspension 1 drop  1 drop Right Eye Once Jimy Lucero MD        proparacaine 0.5 % ophthalmic solution 1 drop  1 drop Right Eye Q5 Min Jimy Lucero MD   1 drop at 05/03/22 0915           Past History     Past Medical History:   Past Medical History:   Diagnosis Date    Cancer     Glaucoma     History of chemotherapy     History of therapeutic radiation     Hypertension     Pterygium eye, right     Squamous cell carcinoma of tongue         Past Surgical History:   Past Surgical History:   Procedure Laterality Date    EXCISION OF PTERYGIUM Right 5/3/2022    Procedure: EXCISION, PTERYGIUM;  Surgeon: Jimy Lucero MD;  Location: [facility];  Service: Ophthalmology;  Laterality: Right;  7th - 0830    TOTAL KNEE ARTHROPLASTY  10/2018        Family History:   Family History   Problem Relation Age of Onset    No Known Problems Mother     No Known Problems Father     Cancer Sister         Social History:   Social History     Tobacco Use    Smoking status: Never    Smokeless tobacco: Never   Substance Use Topics    Alcohol use: Not Currently    Drug use: Never        Allergies:   Review of patient's allergies indicates:  No Known Allergies       Review of Systems   Review of Systems   See HPI for pertinent positives and negatives       Physical Exam     Vitals:    12/24/23 0905 12/24/23 0908 12/24/23 0941 12/24/23 1000   BP:  (!) 260/124 (!) 212/100 (!) 184/91   Pulse:       Resp:       Temp:       TempSrc:       SpO2: 98%      Weight:       Height:          Physical Exam  Vitals and nursing note reviewed.   Constitutional:       General: He is not in acute distress.     Appearance: Normal appearance. He is well-developed. He is not ill-appearing.   HENT:      Head: Normocephalic and atraumatic.    Eyes:      Extraocular Movements: Extraocular movements intact.      Conjunctiva/sclera: Conjunctivae normal.   Pulmonary:      Effort: Pulmonary effort is normal. No respiratory distress.   Musculoskeletal:         General: No deformity or signs of injury. Normal range of motion.      Cervical back: Normal range of motion. No rigidity.        Feet:    Feet:      Comments: Tenderness and mild edema along right 1st MTP.  No erythema  Skin:     General: Skin is dry.      Coloration: Skin is not pale.      Findings: No rash.   Neurological:      General: No focal deficit present.      Mental Status: He is alert and oriented to person, place, and time.      Cranial Nerves: No cranial nerve deficit.      Motor: No weakness.      Coordination: Coordination normal.              Diagnostic Study Results      Labs -   No results found for this or any previous visit (from the past 12 hour(s)).     Radiologic Studies -    No orders to display        Medications given in the ED-   Medications   ketorolac injection 15 mg (15 mg Intramuscular Given 12/24/23 0922)           Medical Decision Making    I am the first provider for this patient.     I reviewed the vital signs, available nursing notes, past medical history, past surgical history, family history and social history.     Vital Signs:  Reviewed the patient's vital signs.     Pulse Oximetry Analysis and Interpretation:    98% on Room Air, normal        External Test Results (Pertinent to encounter):    Records Reviewed: Nursing Notes    History Obtained By: Patient    Provider Notes: Colt Moreno is a 83 y.o. male with patient presents with suspected gout flare of right great toe.  Markedly hypertensive on arrival patient has a history of refractory hypertension has recently switched to clonidine patch.  Will give dose of Toradol in emergency department and reassess    Co-morbidities Considered:     Differential Diagnosis:       ED Course:    BP trended down  appropriately.  Blood pressure similar to prior outpatient visits at this point.  Patient has prescription for Mobic which I recommend he continue taking.  Discussed with him not advised will to give steroids with anti-inflammatories as this could cause a gastritis.         Problems Addressed:  gout    Procedures:   Procedures       Diagnosis and Disposition     Critical Care:      DISCHARGE NOTE:       Colt Moreno's  results have been reviewed with him.  He has been counseled regarding his diagnosis, treatment, and plan.  He verbally conveys understanding and agreement of the signs, symptoms, diagnosis, treatment and prognosis and additionally agrees to follow up as discussed.  He also agrees with the care-plan and conveys that all of his questions have been answered.  I have also provided discharge instructions for him that include: educational information regarding their diagnosis and treatment, and list of reasons why they would want to return to the ED prior to their follow-up appointment, should his condition change. He has been provided with education for proper emergency department utilization.         CLINICAL IMPRESSION:         1. Acute gout involving toe of right foot, unspecified cause    2. Primary hypertension              PLAN:   1. Discharge Home  2.      Medication List        ASK your doctor about these medications      atorvastatin 40 MG tablet  Commonly known as: LIPITOR     cloNIDine 0.3 mg/24 hr td ptwk 0.3 mg/24 hr  Commonly known as: CATAPRES     latanoprost 0.005 % Dpem     levothyroxine 13 mcg Cap  Take 13 mcg by mouth before breakfast.     lisinopriL 20 MG tablet  Commonly known as: PRINIVIL,ZESTRIL     multivitamin capsule             3. Wilma Lucero MD  [address] [address]  [phone]    Schedule an appointment as soon as possible for a visit   Primary care follow up    [facility] Emergency Department  [address] [place] [address]  [place] [id number] [phone]  Go to   If symptoms worsen       _______________________________     Please note that this dictation was completed with [employer], the computer voice recognition software.  Quite often unanticipated grammatical, syntax, homophones, and other interpretive errors are inadvertently transcribed by the computer software.  Please disregard these errors.  Please excuse any errors that have escaped final proofreading.             Mike Shah MD  12/24/23 [id number]

## 2023-12-27 ENCOUNTER — HOSPITAL ENCOUNTER (OUTPATIENT)
Dept: CARDIOLOGY | Facility: HOSPITAL | Age: 83
Discharge: HOME | End: 2023-12-27
Attending: OTOLARYNGOLOGY
Payer: MEDICARE

## 2023-12-27 DIAGNOSIS — C01 PRIMARY SQUAMOUS CELL CARCINOMA OF BASE OF TONGUE: ICD-10-CM

## 2023-12-27 DIAGNOSIS — R22.1 LOCALIZED SWELLING, MASS AND LUMP, NECK: ICD-10-CM

## 2023-12-27 LAB
LEFT ARM DIASTOLIC BLOOD PRESSURE: 100 MMHG
LEFT ARM SYSTOLIC BLOOD PRESSURE: 206 MMHG
LEFT CBA DIAS: 21 CM/S
LEFT CBA SYS: 83 CM/S
LEFT CCA DIST DIAS: 13 CM/S
LEFT CCA DIST SYS: 53 CM/S
LEFT CCA MID DIAS: 19 CM/S
LEFT CCA MID SYS: 55 CM/S
LEFT CCA PROX DIAS: 22 CM/S
LEFT CCA PROX SYS: 89 CM/S
LEFT ECA DIAS: 14 CM/S
LEFT ECA SYS: 60 CM/S
LEFT ICA DIST DIAS: 22 CM/S
LEFT ICA DIST SYS: 98 CM/S
LEFT ICA MID DIAS: 27 CM/S
LEFT ICA MID SYS: 84 CM/S
LEFT ICA PROX DIAS: 32 CM/S
LEFT ICA PROX SYS: 103 CM/S
LEFT VERTEBRAL DIAS: 19 CM/S
LEFT VERTEBRAL SYS: 53 CM/S
OHS CV CAROTID RIGHT ICA EDV HIGHEST: 44
OHS CV CAROTID ULTRASOUND LEFT ICA/CCA RATIO: 1.94
OHS CV CAROTID ULTRASOUND RIGHT ICA/CCA RATIO: 2.73
OHS CV PV CAROTID LEFT HIGHEST CCA: 89
OHS CV PV CAROTID LEFT HIGHEST ICA: 103
OHS CV PV CAROTID RIGHT HIGHEST CCA: 64
OHS CV PV CAROTID RIGHT HIGHEST ICA: 123
OHS CV US CAROTID LEFT HIGHEST EDV: 32
RIGHT CBA DIAS: 10 CM/S
RIGHT CBA SYS: 32 CM/S
RIGHT CCA DIST DIAS: 14 CM/S
RIGHT CCA DIST SYS: 45 CM/S
RIGHT CCA MID DIAS: 17 CM/S
RIGHT CCA MID SYS: 59 CM/S
RIGHT CCA PROX DIAS: 19 CM/S
RIGHT CCA PROX SYS: 64 CM/S
RIGHT ECA DIAS: 12 CM/S
RIGHT ECA SYS: 76 CM/S
RIGHT ICA DIST DIAS: 28 CM/S
RIGHT ICA DIST SYS: 118 CM/S
RIGHT ICA MID DIAS: 44 CM/S
RIGHT ICA MID SYS: 123 CM/S
RIGHT ICA PROX DIAS: 36 CM/S
RIGHT ICA PROX SYS: 86 CM/S
RIGHT VERTEBRAL DIAS: 14 CM/S
RIGHT VERTEBRAL SYS: 62 CM/S

## 2023-12-27 PROCEDURE — 93880 EXTRACRANIAL BILAT STUDY: CPT

## 2023-12-27 PROCEDURE — 93880 CV US DOPPLER CAROTID (CUPID ONLY): ICD-10-PCS | Mod: 26,,, | Performed by: INTERNAL MEDICINE

## 2023-12-27 PROCEDURE — 93880 EXTRACRANIAL BILAT STUDY: CPT | Mod: 26,,, | Performed by: INTERNAL MEDICINE

## 2024-03-18 PROBLEM — Z85.89 ENCOUNTER FOR FOLLOW-UP SURVEILLANCE OF HEAD AND NECK CANCER: Status: RESOLVED | Noted: 2023-12-13 | Resolved: 2024-03-18

## 2024-03-18 PROBLEM — Z08 ENCOUNTER FOR FOLLOW-UP SURVEILLANCE OF HEAD AND NECK CANCER: Status: RESOLVED | Noted: 2023-12-13 | Resolved: 2024-03-18

## 2024-12-12 ENCOUNTER — OFFICE VISIT (OUTPATIENT)
Dept: HEMATOLOGY/ONCOLOGY | Facility: CLINIC | Age: 84
End: 2024-12-12
Payer: MEDICARE

## 2024-12-12 ENCOUNTER — LAB VISIT (OUTPATIENT)
Dept: LAB | Facility: HOSPITAL | Age: 84
End: 2024-12-12
Attending: INTERNAL MEDICINE
Payer: MEDICARE

## 2024-12-12 VITALS
SYSTOLIC BLOOD PRESSURE: 141 MMHG | BODY MASS INDEX: 21.8 KG/M2 | OXYGEN SATURATION: 98 % | RESPIRATION RATE: 16 BRPM | HEIGHT: 68 IN | WEIGHT: 143.88 LBS | DIASTOLIC BLOOD PRESSURE: 82 MMHG | HEART RATE: 60 BPM

## 2024-12-12 DIAGNOSIS — E03.9 HYPOTHYROIDISM (ACQUIRED): ICD-10-CM

## 2024-12-12 DIAGNOSIS — Z08 ENCOUNTER FOR FOLLOW-UP SURVEILLANCE OF HEAD AND NECK CANCER: ICD-10-CM

## 2024-12-12 DIAGNOSIS — C79.89 SECONDARY MALIGNANT NEOPLASM OF OTHER SPECIFIED SITES: ICD-10-CM

## 2024-12-12 DIAGNOSIS — C79.89 SECONDARY MALIGNANT NEOPLASM OF OTHER SPECIFIED SITES: Primary | ICD-10-CM

## 2024-12-12 DIAGNOSIS — Z85.89 ENCOUNTER FOR FOLLOW-UP SURVEILLANCE OF HEAD AND NECK CANCER: ICD-10-CM

## 2024-12-12 LAB
ALBUMIN SERPL BCP-MCNC: 3.4 G/DL (ref 3.5–5.2)
ALP SERPL-CCNC: 70 U/L (ref 40–150)
ALT SERPL W/O P-5'-P-CCNC: 23 U/L (ref 10–44)
ANION GAP SERPL CALC-SCNC: 9 MMOL/L (ref 8–16)
AST SERPL-CCNC: 30 U/L (ref 10–40)
BASOPHILS # BLD AUTO: 0.01 K/UL (ref 0–0.2)
BASOPHILS NFR BLD: 0.1 % (ref 0–1.9)
BILIRUB SERPL-MCNC: 0.4 MG/DL (ref 0.1–1)
BUN SERPL-MCNC: 30 MG/DL (ref 8–23)
CALCIUM SERPL-MCNC: 9.9 MG/DL (ref 8.7–10.5)
CHLORIDE SERPL-SCNC: 108 MMOL/L (ref 95–110)
CO2 SERPL-SCNC: 26 MMOL/L (ref 23–29)
CREAT SERPL-MCNC: 1.3 MG/DL (ref 0.5–1.4)
DIFFERENTIAL METHOD BLD: ABNORMAL
EOSINOPHIL # BLD AUTO: 0.1 K/UL (ref 0–0.5)
EOSINOPHIL NFR BLD: 1.4 % (ref 0–8)
ERYTHROCYTE [DISTWIDTH] IN BLOOD BY AUTOMATED COUNT: 14.1 % (ref 11.5–14.5)
EST. GFR  (NO RACE VARIABLE): 54.8 ML/MIN/1.73 M^2
GLUCOSE SERPL-MCNC: 93 MG/DL (ref 70–110)
HCT VFR BLD AUTO: 34.9 % (ref 40–54)
HGB BLD-MCNC: 11.5 G/DL (ref 14–18)
IMM GRANULOCYTES # BLD AUTO: 0.02 K/UL (ref 0–0.04)
IMM GRANULOCYTES NFR BLD AUTO: 0.3 % (ref 0–0.5)
LYMPHOCYTES # BLD AUTO: 1.4 K/UL (ref 1–4.8)
LYMPHOCYTES NFR BLD: 19.6 % (ref 18–48)
MCH RBC QN AUTO: 29.8 PG (ref 27–31)
MCHC RBC AUTO-ENTMCNC: 33 G/DL (ref 32–36)
MCV RBC AUTO: 90 FL (ref 82–98)
MONOCYTES # BLD AUTO: 1.1 K/UL (ref 0.3–1)
MONOCYTES NFR BLD: 14.8 % (ref 4–15)
NEUTROPHILS # BLD AUTO: 4.7 K/UL (ref 1.8–7.7)
NEUTROPHILS NFR BLD: 63.8 % (ref 38–73)
NRBC BLD-RTO: 0 /100 WBC
PLATELET # BLD AUTO: 275 K/UL (ref 150–450)
PMV BLD AUTO: 9.2 FL (ref 9.2–12.9)
POTASSIUM SERPL-SCNC: 4.2 MMOL/L (ref 3.5–5.1)
PROT SERPL-MCNC: 6.5 G/DL (ref 6–8.4)
RBC # BLD AUTO: 3.86 M/UL (ref 4.6–6.2)
SODIUM SERPL-SCNC: 143 MMOL/L (ref 136–145)
T4 FREE SERPL-MCNC: 1.09 NG/DL (ref 0.71–1.51)
TSH SERPL DL<=0.005 MIU/L-ACNC: 4.24 UIU/ML (ref 0.4–4)
WBC # BLD AUTO: 7.36 K/UL (ref 3.9–12.7)

## 2024-12-12 PROCEDURE — 85025 COMPLETE CBC W/AUTO DIFF WBC: CPT | Performed by: INTERNAL MEDICINE

## 2024-12-12 PROCEDURE — 99213 OFFICE O/P EST LOW 20 MIN: CPT | Mod: PBBFAC | Performed by: INTERNAL MEDICINE

## 2024-12-12 PROCEDURE — 80053 COMPREHEN METABOLIC PANEL: CPT | Performed by: INTERNAL MEDICINE

## 2024-12-12 PROCEDURE — 99999 PR PBB SHADOW E&M-EST. PATIENT-LVL III: CPT | Mod: PBBFAC,,, | Performed by: INTERNAL MEDICINE

## 2024-12-12 PROCEDURE — 99214 OFFICE O/P EST MOD 30 MIN: CPT | Mod: S$PBB,,, | Performed by: INTERNAL MEDICINE

## 2024-12-12 PROCEDURE — 36415 COLL VENOUS BLD VENIPUNCTURE: CPT | Performed by: INTERNAL MEDICINE

## 2024-12-12 PROCEDURE — 84443 ASSAY THYROID STIM HORMONE: CPT | Performed by: INTERNAL MEDICINE

## 2024-12-12 PROCEDURE — 84439 ASSAY OF FREE THYROXINE: CPT | Performed by: INTERNAL MEDICINE

## 2024-12-12 RX ORDER — AMLODIPINE BESYLATE 10 MG/1
10 TABLET ORAL DAILY
COMMUNITY
Start: 2024-11-18

## 2024-12-12 RX ORDER — LATANOPROST 50 UG/ML
1 SOLUTION/ DROPS OPHTHALMIC NIGHTLY
COMMUNITY
Start: 2024-12-01

## 2024-12-12 RX ORDER — LEVOTHYROXINE SODIUM 50 UG/1
50 TABLET ORAL
COMMUNITY
Start: 2024-12-04

## 2024-12-12 NOTE — PROGRESS NOTES
"PATIENT: Colt Moreno  MRN: [id number]  DATE: 12/12/2024      Subjective:     Chief complaint:  Chief Complaint   Patient presents with    Cancer    Follow-up       Oncologic History:  Copied from past cancer survivorship clinic note at [facility] dated 10/7/22:    12/15/2010 Initial Diagnosis   In 12/2010, he noticed a lump in his left neck. He was worked up by his local physicians with a CT scan of the head and neck on 12/15/2011 which demonstrated a 2.5 cm lymph node in the left internal jugular region as well as a 1.2 cm lymph node superior to that node.    1/19/2012 [place]-Surgery   Left base of tongue (1):  Squamous mucosa with minute focus of mild dysplasia    Right posterior tongue, biopsy (2):  Poorly differentiated squamoid focus, can not exclude squamous carcinoma    Left posterior tongue, biopsy (3);  Squamous mucosa and submucosal lymphoid nodules, no carcinoma seen    Left posterior tongue, biopsy (4):  INVASIVE SQUAMOUS CARCINOMA -- Poorly differentiated with a depth of invasion at least 3 mm  Perineural Invasion: Not Evaluable     1/29/2012 [place]-Biopsy   "left neck lymph node", fine needle aspiration:  SQUAMOUS CELL CARCINOMA, POORLY DIFFERENTIATED    3/2012 - 4/2012 [place]-Radiation Therapy   70 Gy in 33 fractions concurrent with weekly cisplatin    3/2012 - 4/2012 [place]-Chemotherapy/Biotherapy/Investigational/SMI   Concurrent with radiation therapy; weekly cisplatin     7/2014 - Status-Eligible for Survivorship         He has since been followed with serial clinic visits and imaging and has been found to be without evidence of recurent or residual disease. For this reason, Ran Moreno was transitioned into the Head [facility] Neck Survivorship Clinic.            Interval History: Mr. 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 He presents for oncology follow-up with concerns about the " "following:  Upper back pain: Pain localized to the midline thoracic region, present for six months, occurring unpredictably and during physical work. The pain is not associated with weakness, tingling, or numbness. He denies any impact on quality of life.  Neck scar tissue: Reports palpable "bumps" in the neck that he attributes to scar tissue from radiation therapy. These have been present for about a year and do not appear to be increasing in size.  Previous imaging: Patient recalls an ultrasound of the carotid arteries performed about a year ago, with no follow-up provided. He has mild narrowing but no significant cardiovascular symptoms.  He denies shortness of breath, chest pain, new systemic symptoms, or other localized complaints. He is under the care of a cardiologist for routine follow-up.      Review of Systems   A comprehensive review of systems was performed; pertinent positives and negatives are noted in the HPI.        ECOG Performance Status:   ECOG SCORE    1 - Restricted in strenuous activity-ambulatory and able to carry out work of a light nature         Objective:      Vitals:   Vitals:    12/12/24 0920   BP: (!) 141/82   BP Location: Left arm   Patient Position: Sitting   Pulse: 60   Resp: 16   SpO2: 98%   Weight: 65.2 kg (143 lb 13.6 oz)   Height: 5' 8" (1.727 m)     BMI: Body mass index is 21.87 kg/m².      Physical Exam:   Physical Exam  Vitals and nursing note reviewed.   Constitutional:       General: He is not in acute distress.     Appearance: Normal appearance. He is normal weight. He is not toxic-appearing.   HENT:      Head: Normocephalic and atraumatic.   Eyes:      General: No scleral icterus.     Conjunctiva/sclera: Conjunctivae normal.   Pulmonary:      Effort: Pulmonary effort is normal. No respiratory distress.   Musculoskeletal:         General: No deformity.   Lymphadenopathy:      Head:      Right side of head: No submental, submandibular, tonsillar or preauricular adenopathy. "      Left side of head: No submental, submandibular, tonsillar or preauricular adenopathy.      Cervical:      Right cervical: No superficial, deep or posterior cervical adenopathy.     Left cervical: No superficial, deep or posterior cervical adenopathy.      Comments: There is some skin thickening in the left submandibular region.   Skin:     Coloration: Skin is not jaundiced.      Findings: No erythema.   Neurological:      General: No focal deficit present.      Mental Status: He is alert and oriented to person, place, and time. Mental status is at baseline.   Psychiatric:         Mood and Affect: Mood normal.         Behavior: Behavior normal.         Thought Content: Thought content normal.           Laboratory Data:  WBC   Date Value Ref Range Status   12/12/2024 7.36 3.90 - 12.70 K/uL Final     Hemoglobin   Date Value Ref Range Status   12/12/2024 11.5 (L) 14.0 - 18.0 g/dL Final     Hematocrit   Date Value Ref Range Status   12/12/2024 34.9 (L) 40.0 - 54.0 % Final     Platelets   Date Value Ref Range Status   12/12/2024 275 150 - 450 K/uL Final     Gran # (ANC)   Date Value Ref Range Status   12/12/2024 4.7 1.8 - 7.7 K/uL Final     Gran %   Date Value Ref Range Status   12/12/2024 63.8 38.0 - 73.0 % Final       Chemistry        Component Value Date/Time     12/12/2024 1014    K 4.2 12/12/2024 1014     12/12/2024 1014    CO2 26 12/12/2024 1014    BUN 30 (H) 12/12/2024 1014    CREATININE 1.3 12/12/2024 1014    GLU 93 12/12/2024 1014        Component Value Date/Time    CALCIUM 9.9 12/12/2024 1014    ALKPHOS 70 12/12/2024 1014    AST 30 12/12/2024 1014    ALT 23 12/12/2024 1014    BILITOT 0.4 12/12/2024 1014              Assessment/Plan:     1. Secondary malignant neoplasm of other specified sites    2. Encounter for follow-up surveillance of head and neck cancer    3. Hypothyroidism (acquired)      History of T2 N2 M0 p16+ SCC of the base of the tongue, s/p definitive CRT (2012):  Assessment: No  evidence of recurrence based on history and physical examination. Palpable neck findings likely represent radiation-induced scar tissue, but imaging is warranted for reassurance.  Plan:  Order a contrast-enhanced CT scan of the neck and upper thorax to evaluate neck masses and thoracic spine pain.  Check renal function via laboratory testing to ensure contrast safety.  2. Midline thoracic back pain:  Assessment: Likely age-related degenerative changes (e.g., arthritis), as the pain is mechanical, not progressive, and not associated with neurological symptoms.  Plan:  Include thoracic spine in CT imaging to evaluate for structural changes.  Monitor for worsening or new neurological symptoms (e.g., weakness, numbness). Escalate to MRI if symptoms progress.  3. Neck scar tissue:  Assessment: Consistent with post-radiation changes; no significant increase in size.  Plan:  Reassess findings on CT imaging.  4. History of mild carotid artery narrowing:  Assessment: No new symptoms suggestive of cerebrovascular insufficiency. Patient is under cardiology care and had an unremarkable carotid ultrasound last year.  Plan:  No further action required unless symptoms arise.  5. General follow-up:  Assessment: Overall, the patient appears well with no signs of systemic disease or significant treatment-related sequelae requiring immediate intervention.  Plan:  Repeat imaging as indicated above.  Return to the clinic in six months for follow-up unless imaging or labs indicate earlier evaluation.  Follow-Up Actions:  Perform bloodwork today to assess kidney function.  Schedule contrast-enhanced CT of the neck and thoracic spine.  Revisit with imaging results to determine further management needs.  Encourage continued follow-up with cardiologist and report any new or concerning symptoms promptly.    Med and Orders:  Orders Placed This Encounter    CT Soft Tissue Neck With Contrast    CBC W/ AUTO DIFFERENTIAL    CMP    TSH       Follow  Up:  Follow up in about 6 months (around 6/12/2025).      Above care plan was discussed with patient and all questions were addressed to their expressed satisfaction.       Patrick Garner MD, FACP  Hematology & Medical Oncology  [facility]

## 2024-12-13 ENCOUNTER — DOCUMENTATION ONLY (OUTPATIENT)
Dept: HEMATOLOGY/ONCOLOGY | Facility: CLINIC | Age: 84
End: 2024-12-13
Payer: MEDICARE

## 2025-01-06 ENCOUNTER — HOSPITAL ENCOUNTER (OUTPATIENT)
Dept: RADIOLOGY | Facility: HOSPITAL | Age: 85
Discharge: HOME | End: 2025-01-06
Payer: MEDICARE

## 2025-01-06 ENCOUNTER — OFFICE VISIT (OUTPATIENT)
Dept: OTOLARYNGOLOGY | Facility: CLINIC | Age: 85
End: 2025-01-06
Payer: MEDICARE

## 2025-01-06 DIAGNOSIS — Z85.89 ENCOUNTER FOR FOLLOW-UP SURVEILLANCE OF HEAD AND NECK CANCER: ICD-10-CM

## 2025-01-06 DIAGNOSIS — Z08 ENCOUNTER FOR FOLLOW-UP SURVEILLANCE OF HEAD AND NECK CANCER: ICD-10-CM

## 2025-01-06 DIAGNOSIS — E03.9 HYPOTHYROIDISM (ACQUIRED): ICD-10-CM

## 2025-01-06 DIAGNOSIS — Z85.819 HISTORY OF OROPHARYNGEAL CANCER: Primary | ICD-10-CM

## 2025-01-06 DIAGNOSIS — C79.89 SECONDARY MALIGNANT NEOPLASM OF OTHER SPECIFIED SITES: ICD-10-CM

## 2025-01-06 PROCEDURE — 31575 DIAGNOSTIC LARYNGOSCOPY: CPT | Mod: S$PBB,,, | Performed by: STUDENT IN AN ORGANIZED HEALTH CARE EDUCATION/TRAINING PROGRAM

## 2025-01-06 PROCEDURE — 25500020 PHARM REV CODE 255: Performed by: INTERNAL MEDICINE

## 2025-01-06 PROCEDURE — 99212 OFFICE O/P EST SF 10 MIN: CPT | Mod: PBBFAC,25 | Performed by: STUDENT IN AN ORGANIZED HEALTH CARE EDUCATION/TRAINING PROGRAM

## 2025-01-06 PROCEDURE — 70491 CT SOFT TISSUE NECK W/DYE: CPT | Mod: 26,,, | Performed by: RADIOLOGY

## 2025-01-06 PROCEDURE — 99213 OFFICE O/P EST LOW 20 MIN: CPT | Mod: 25,S$PBB,, | Performed by: STUDENT IN AN ORGANIZED HEALTH CARE EDUCATION/TRAINING PROGRAM

## 2025-01-06 PROCEDURE — 31575 DIAGNOSTIC LARYNGOSCOPY: CPT | Mod: PBBFAC | Performed by: STUDENT IN AN ORGANIZED HEALTH CARE EDUCATION/TRAINING PROGRAM

## 2025-01-06 PROCEDURE — 99999 PR PBB SHADOW E&M-EST. PATIENT-LVL II: CPT | Mod: PBBFAC,,, | Performed by: STUDENT IN AN ORGANIZED HEALTH CARE EDUCATION/TRAINING PROGRAM

## 2025-01-06 PROCEDURE — 70491 CT SOFT TISSUE NECK W/DYE: CPT | Mod: TC

## 2025-01-06 RX ADMIN — IOHEXOL 75 ML: 350 INJECTION, SOLUTION INTRAVENOUS at 01:01

## 2025-01-06 NOTE — PROGRESS NOTES
Note to patients: In accordance with the  Century Cures Act, patients are now granted immediate electronic access to their medical records. This note is primarily intended for communication among medical professionals. As a result, it may incorporate medical terminology, abbreviations, or language that could appear blunt or unfamiliar. If you have questions about this document, we encourage you to discuss it with your physician.      [facility]  Head & Neck Clinic    Patient: Colt Moreno    : 1940    MRN: [id number]  Primary Care Provider: Wilma Lucero MD  Referring Provider: Krish Naqvi MD   Med Onc: Dr. 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 male with Hx of HPV+ SCC of the left base of tongue s/p CCRT completed  presenting for surveillance. He reports overall doing well, with some reduced saliva. He has had worsening pain in his upper cervical spine. He has stable scar tissue in the left neck (presented with LAD in 2012).    Patient denies pain, fever, chills, night sweats, unintentional weight loss, neck mass, enlarged lymph nodes outside of the head or neck, odynophagia, dysphagia, globus sensation, voice changes, cough, hemoptysis, shortness of breath, or new or concerning skin lesions.     Patient lives in [place]    TREATMENT HISTORY:  12/15/2011 Initial Diagnosis  In 2011, he noticed a lump in his left neck. He was worked up by his local physicians with a CT scan of the head and neck on 12/15/2011 which demonstrated a 2.5 cm lymph node in the left internal jugular region as well as a 1.2 cm lymph node superior to that node.    2012 [place]-Surgery  Left base of tongue (1):  Squamous mucosa with minute focus of mild dysplasia    Right  posterior tongue, biopsy (2):  Poorly differentiated squamoid focus, can not exclude squamous carcinoma    Left posterior tongue, biopsy (3);  Squamous mucosa and submucosal lymphoid nodules, no carcinoma seen    Left posterior tongue, biopsy (4):  INVASIVE SQUAMOUS CARCINOMA -- Poorly differentiated with a depth of invasion at least 3 mm  Perineural Invasion: Not Evaluable    1/29/2012 DX-Biopsy  left neck lymph node, fine needle aspiration:  SQUAMOUS CELL CARCINOMA, POORLY DIFFERENTIATED    3/2012 - 4/2012 [place]-Radiation Therapy  70 Gy in 33 fractions concurrent with weekly cisplatin    3/2012 - 4/2012 TX-Chemotherapy/Biotherapy/Investigational/SMI  Concurrent with radiation therapy; weekly cisplatin     ALLERGIES:  Review of patient's allergies indicates:  No Known Allergies      MEDICATIONS:    Current Outpatient Medications:     amLODIPine (NORVASC) 10 MG tablet, Take 10 mg by mouth once daily., Disp: , Rfl:     atorvastatin (LIPITOR) 40 MG tablet, Take 40 mg by mouth once daily., Disp: , Rfl:     latanoprost 0.005 % ophthalmic solution, Place 1 drop into both eyes every evening., Disp: , Rfl:     levothyroxine (SYNTHROID) 50 MCG tablet, Take 50 mcg by mouth before breakfast., Disp: , Rfl:     multivitamin capsule, Take 1 capsule by mouth once daily., Disp: , Rfl:   No current facility-administered medications for this visit.    Facility-Administered Medications Ordered in Other Visits:     ceFAZolin (ANCEF) 100 mg in sterile water for injection 0.5 mL subconjunctival soln (conc: 100 mg/0.5 mL), 100 mg, Subconjunctival, Once, Jimy Lucero MD    dexamethasone injection 2 mg, 2 mg, Other, Once, Jimy Lucero MD    LIDOcaine (PF) 10 mg/ml (1%) injection 10 mg, 1 mL, Intradermal, Once, Jimy Lucero MD    mitoMYcin Kit 0.2 mg, 0.2 mg, Right Eye, Once, Jimy Lucero MD    neomycin-polymyxin-dexamethasone ophthalmic ointment 1 application, 1 application , Right Eye, Once, Jimy Lucero  MD    prednisoLONE acetate 1 % ophthalmic suspension 1 drop, 1 drop, Right Eye, Once, Jimy Lucero MD    proparacaine 0.5 % ophthalmic solution 1 drop, 1 drop, Right Eye, Q5 Min, Jimy Lucero MD, 1 drop at 05/03/22 0915    PAST MEDICAL HISTORY:  Past Medical History:   Diagnosis Date    Cancer     Glaucoma     History of chemotherapy     History of therapeutic radiation     Hypertension     Pterygium eye, right     Squamous cell carcinoma of tongue         PAST SURGICAL HISTORY:  Past Surgical History:   Procedure Laterality Date    EXCISION OF PTERYGIUM Right 5/3/2022    Procedure: EXCISION, PTERYGIUM;  Surgeon: Jimy Lucero MD;  Location: [facility];  Service: Ophthalmology;  Laterality: Right;  7th - 0830    TOTAL KNEE ARTHROPLASTY  10/2018        FAMILY HISTORY:  Family History   Problem Relation Name Age of Onset    No Known Problems Mother      No Known Problems Father      Cancer Sister         SOCIAL HISTORY:  Social History     Socioeconomic History    Marital status:    Tobacco Use    Smoking status: Never    Smokeless tobacco: Never   Substance and Sexual Activity    Alcohol use: Not Currently    Drug use: Never    Sexual activity: Yes     Partners: Female     Social Drivers of Health     Financial Resource Strain: Low Risk  (12/5/2024)    Overall Financial Resource Strain (CARDIA)     Difficulty of Paying Living Expenses: Not very hard   Food Insecurity: No Food Insecurity (12/5/2024)    Hunger Vital Sign     Worried About Running Out of Food in the Last Year: Never true     Ran Out of Food in the Last Year: Never true   Physical Activity: Insufficiently Active (12/5/2024)    Exercise Vital Sign     Days of Exercise per Week: 5 days     Minutes of Exercise per Session: 20 min   Stress: No Stress Concern Present (12/5/2024)    [place] [employer] - Occupational Stress Questionnaire     Feeling of Stress : Only a little   Housing Stability: Unknown (12/5/2024)     Housing Stability Vital Sign     Unable to Pay for Housing in the Last Year: No     See above substance history    REVIEW OF SYSTEMS:   Comprehensive review of systems was discussed with the patient.  It is positive only for the above complaints.    PHYSICAL EXAMINATION:  There were no vitals taken for this visit.    Constitutional: Non-toxic appearing.   Psychiatric: Appropriate mood and affect. Cooperative.  Voice: Non-dysphonic, speaking in full sentences.   Neurologic: Cranial nerves grossly intact, no focal deficits.  Head and face: Salivary glands are not enlarged - left SMG firm. Face is symmetric. CN VII strength intact.  Skin: No concerning skin lesions.   Eyes: Vision grossly intact, bilateral extraocular movements intact  Ears: Bilateral pinna, mastoid, external ear canal normal. Hearing intact.   Nose: External nose appears normal.   Lips: No ulcers or lesions  Oral cavity: Mucosa is pink and moist. Buccal mucosa, gingiva, anterior tongue, floor of mouth, and hard palate appear normal. No leukoplakia, erythroplakia, ulceration, masses or lesions.  Oropharynx: Mucosa is pink and moist. Soft palate and base of tongue are normal. Posterior pharyngeal wall normal. Tonsils are normal. No lesions.  Neck: Woody, enlarged tissue in left neck, nontender, consistent with fibrosis.    Respiratory: Chest expansion symmetric, no audible stridor or stertor. Breathing is unlabored. No active cough.    PROCEDURES:    FLEXIBLE LARYNGOSCOPY  Provider: Radha Maldonado MD  Indication: History of cancer   The patient was unable to tolerate mirror laryngoscopy.  The procedure was explained to the patient and verbal consent was obtained.   Anesthesia: topical lidocaine and neosynephrine applied within one or both nares with an atomizer    The scope was introduced in the usual fashion in the LEFT    Findings:  Mucosa: radiated  Inferior turbinates: no polypoid changes or hypertrophy  Septum: no lesion or ulcer  Middle meatus:  no purulence or polypoid change  Nasopharynx:  Adenoids: normal  Fossa of Rosenmuller: normal  Eustachian tubes: normal  Superior and posterior pharyngeal walls: normal   Epiglottis, vallecula, and base of tongue: normal, radiation changes  Pyriform sinuses: no pooling of secretions or saliva in pyriform sinuses, mucosa normal  False vocal cords, arytenoids, aryepiglottic folds: normal  True vocal cords are symmetrically mobile on phonation and inspiration - small mucosal irregularity.   Laryngeal sensation appears intact. There are no masses or ulcerations.     The scope was withdrawn. The patient tolerated the procedure well with no complications.      DATA REVIEWED:     LABORATORY:  N/A    PATHOLOGY:  N/A    IMAGING:  Pending this afternoon    ASSESSMENT AND PLAN:  1. History of oropharyngeal cancer         Colt Moreno is a 84 y.o. male presenting for surveillance visit. Pain in the cervical spine but otherwise doing well.  - Referral to spine clinic  - Will review CT neck  - If imaging FAVIO, can follow-up 1 year     Patient encouraged to call with any questions, concerns, or new or worsening symptoms.     Follow up 1 year

## 2025-03-07 ENCOUNTER — LAB VISIT (OUTPATIENT)
Dept: LAB | Facility: HOSPITAL | Age: 85
End: 2025-03-07
Attending: NURSE PRACTITIONER
Payer: MEDICARE

## 2025-03-07 DIAGNOSIS — M10.9 ACUTE GOUT OF RIGHT ELBOW, UNSPECIFIED CAUSE: ICD-10-CM

## 2025-03-07 LAB
CRP SERPL-MCNC: 53.4 MG/L (ref 0–8.2)
ERYTHROCYTE [SEDIMENTATION RATE] IN BLOOD: 60 MM/HR (ref 0–10)
URATE SERPL-MCNC: 3.5 MG/DL (ref 3.4–7)

## 2025-03-07 PROCEDURE — 85651 RBC SED RATE NONAUTOMATED: CPT | Performed by: NURSE PRACTITIONER

## 2025-03-07 PROCEDURE — 84550 ASSAY OF BLOOD/URIC ACID: CPT | Performed by: NURSE PRACTITIONER

## 2025-03-07 PROCEDURE — 36415 COLL VENOUS BLD VENIPUNCTURE: CPT | Performed by: NURSE PRACTITIONER

## 2025-03-07 PROCEDURE — 86140 C-REACTIVE PROTEIN: CPT | Performed by: NURSE PRACTITIONER

## 2025-03-21 PROBLEM — H25.12 AGE-RELATED NUCLEAR CATARACT, LEFT EYE: Status: ACTIVE | Noted: 2025-03-21

## 2025-03-21 PROBLEM — H25.042 POSTERIOR SUBCAPSULAR AGE-RELATED CATARACT, LEFT EYE: Status: ACTIVE | Noted: 2025-03-21

## 2025-03-21 PROBLEM — H25.012 CORTICAL AGE-RELATED CATARACT, LEFT EYE: Status: ACTIVE | Noted: 2025-03-21

## 2025-03-24 ENCOUNTER — ANESTHESIA EVENT (OUTPATIENT)
Dept: SURGERY | Facility: HOSPITAL | Age: 85
End: 2025-03-24
Payer: MEDICARE

## 2025-03-24 NOTE — ANESTHESIA PREPROCEDURE EVALUATION
03/24/2025  Colt Moreno is a 84 y.o., male.      Pre-op Assessment    I have reviewed the Patient Summary Reports.    I have reviewed the NPO Status.   I have reviewed the Medications.     Review of Systems  Anesthesia Hx:  No problems with previous Anesthesia             Denies Family Hx of Anesthesia complications.    Denies Personal Hx of Anesthesia complications.                    Social:  Non-Smoker       Hematology/Oncology:                        --  Cancer in past history:              radiation and chemotherapy   Oncology Comments: tongue     Cardiovascular:     Hypertension, well controlled                                          Pulmonary:  Pulmonary Normal                       Renal/:  Renal/ Normal                 Hepatic/GI:  Hepatic/GI Normal                    Neurological:  Neurology Normal                                      Endocrine:  Endocrine Normal              Lab Results   Component Value Date    WBC 7.36 12/12/2024    HGB 11.5 (L) 12/12/2024    HCT 34.9 (L) 12/12/2024    MCV 90 12/12/2024     12/12/2024          Physical Exam  General: Well nourished    Airway:  Mallampati: III / II  Mouth Opening: Normal  TM Distance: Normal  Tongue: Normal  Neck ROM: Normal ROM    Dental:  Intact    Chest/Lungs:  Clear to auscultation    Heart:  Rate: Normal  Rhythm: Regular Rhythm  Sounds: Normal        Anesthesia Plan  Type of Anesthesia, risks & benefits discussed:    Anesthesia Type: MAC  Intra-op Monitoring Plan: Standard ASA Monitors  Post Op Pain Control Plan: multimodal analgesia  Induction:  IV  Airway Plan: Direct  Informed Consent: Informed consent signed with the Patient and all parties understand the risks and agree with anesthesia plan.  All questions answered.   ASA Score: 3  Day of Surgery Review of History & Physical: I have interviewed and examined the patient. I  have reviewed the patient's H&P dated: There are no significant changes.     Ready For Surgery From Anesthesia Perspective.     .

## 2025-03-25 ENCOUNTER — ANESTHESIA (OUTPATIENT)
Dept: SURGERY | Facility: HOSPITAL | Age: 85
End: 2025-03-25
Payer: MEDICARE

## 2025-03-25 ENCOUNTER — HOSPITAL ENCOUNTER (OUTPATIENT)
Facility: HOSPITAL | Age: 85
Discharge: HOME | End: 2025-03-25
Attending: OPHTHALMOLOGY | Admitting: OPHTHALMOLOGY
Payer: MEDICARE

## 2025-03-25 DIAGNOSIS — H25.12 AGE-RELATED NUCLEAR CATARACT, LEFT EYE: ICD-10-CM

## 2025-03-25 PROBLEM — H25.012 CORTICAL AGE-RELATED CATARACT, LEFT EYE: Status: RESOLVED | Noted: 2025-03-21 | Resolved: 2025-03-25

## 2025-03-25 PROBLEM — H25.042 POSTERIOR SUBCAPSULAR AGE-RELATED CATARACT, LEFT EYE: Status: RESOLVED | Noted: 2025-03-21 | Resolved: 2025-03-25

## 2025-03-25 PROCEDURE — 63600175 PHARM REV CODE 636 W HCPCS: Mod: JZ,TB | Performed by: OPHTHALMOLOGY

## 2025-03-25 PROCEDURE — 36000707: Performed by: OPHTHALMOLOGY

## 2025-03-25 PROCEDURE — 25000003 PHARM REV CODE 250: Performed by: OPHTHALMOLOGY

## 2025-03-25 PROCEDURE — 71000015 HC POSTOP RECOV 1ST HR: Performed by: OPHTHALMOLOGY

## 2025-03-25 PROCEDURE — V2632 POST CHMBR INTRAOCULAR LENS: HCPCS | Performed by: OPHTHALMOLOGY

## 2025-03-25 PROCEDURE — 37000008 HC ANESTHESIA 1ST 15 MINUTES: Performed by: OPHTHALMOLOGY

## 2025-03-25 PROCEDURE — 25000242 PHARM REV CODE 250 ALT 637 W/ HCPCS: Performed by: ANESTHESIOLOGY

## 2025-03-25 PROCEDURE — 36000706: Performed by: OPHTHALMOLOGY

## 2025-03-25 PROCEDURE — 37000009 HC ANESTHESIA EA ADD 15 MINS: Performed by: OPHTHALMOLOGY

## 2025-03-25 DEVICE — STERILE UV AND BLUE LIGHT FILTERING ACRYLIC FOLDABLE ASPHERIC POSTERIOR CHAMBER INTRAOCULAR LENS
Type: IMPLANTABLE DEVICE | Site: EYE | Status: FUNCTIONAL
Brand: CLAREON

## 2025-03-25 RX ORDER — MIDAZOLAM HYDROCHLORIDE 2 MG/ML
2 SYRUP ORAL ONCE
Status: COMPLETED | OUTPATIENT
Start: 2025-03-25 | End: 2025-03-25

## 2025-03-25 RX ORDER — PREDNISOLONE ACETATE 10 MG/ML
SUSPENSION/ DROPS OPHTHALMIC
Status: DISCONTINUED | OUTPATIENT
Start: 2025-03-25 | End: 2025-03-25 | Stop reason: HOSPADM

## 2025-03-25 RX ORDER — LIDOCAINE HYDROCHLORIDE 10 MG/ML
1 INJECTION, SOLUTION EPIDURAL; INFILTRATION; INTRACAUDAL; PERINEURAL ONCE
Status: DISCONTINUED | OUTPATIENT
Start: 2025-03-25 | End: 2025-03-25 | Stop reason: HOSPADM

## 2025-03-25 RX ORDER — BROMFENAC SODIUM 0.7 MG/ML
SOLUTION/ DROPS OPHTHALMIC
Status: DISCONTINUED | OUTPATIENT
Start: 2025-03-25 | End: 2025-03-25 | Stop reason: HOSPADM

## 2025-03-25 RX ORDER — MOXIFLOXACIN 5 MG/ML
1 SOLUTION/ DROPS OPHTHALMIC
Status: COMPLETED | OUTPATIENT
Start: 2025-03-25 | End: 2025-03-25

## 2025-03-25 RX ORDER — PROPARACAINE HYDROCHLORIDE 5 MG/ML
SOLUTION/ DROPS OPHTHALMIC
Status: DISCONTINUED | OUTPATIENT
Start: 2025-03-25 | End: 2025-03-25 | Stop reason: HOSPADM

## 2025-03-25 RX ORDER — MOXIFLOXACIN 5 MG/ML
SOLUTION/ DROPS OPHTHALMIC
Status: DISCONTINUED | OUTPATIENT
Start: 2025-03-25 | End: 2025-03-25 | Stop reason: HOSPADM

## 2025-03-25 RX ORDER — TROPICAMIDE 10 MG/ML
1 SOLUTION/ DROPS OPHTHALMIC
Status: COMPLETED | OUTPATIENT
Start: 2025-03-25 | End: 2025-03-25

## 2025-03-25 RX ORDER — LIDOCAINE HYDROCHLORIDE 10 MG/ML
0.5 INJECTION, SOLUTION EPIDURAL; INFILTRATION; INTRACAUDAL; PERINEURAL ONCE
Status: DISCONTINUED | OUTPATIENT
Start: 2025-03-25 | End: 2025-03-25 | Stop reason: HOSPADM

## 2025-03-25 RX ORDER — PROPARACAINE HYDROCHLORIDE 5 MG/ML
1 SOLUTION/ DROPS OPHTHALMIC
Status: COMPLETED | OUTPATIENT
Start: 2025-03-25 | End: 2025-03-25

## 2025-03-25 RX ORDER — LIDOCAINE HYDROCHLORIDE 10 MG/ML
INJECTION, SOLUTION EPIDURAL; INFILTRATION; INTRACAUDAL; PERINEURAL
Status: DISCONTINUED | OUTPATIENT
Start: 2025-03-25 | End: 2025-03-25 | Stop reason: HOSPADM

## 2025-03-25 RX ORDER — PHENYLEPHRINE HYDROCHLORIDE 25 MG/ML
1 SOLUTION/ DROPS OPHTHALMIC
Status: COMPLETED | OUTPATIENT
Start: 2025-03-25 | End: 2025-03-25

## 2025-03-25 RX ADMIN — TROPICAMIDE 1 DROP: 10 SOLUTION/ DROPS OPHTHALMIC at 10:03

## 2025-03-25 RX ADMIN — PHENYLEPHRINE HYDROCHLORIDE 1 DROP: 25 SOLUTION/ DROPS OPHTHALMIC at 10:03

## 2025-03-25 RX ADMIN — MOXIFLOXACIN 1 DROP: 5 SOLUTION/ DROPS OPHTHALMIC at 10:03

## 2025-03-25 RX ADMIN — PROPARACAINE HYDROCHLORIDE 1 DROP: 5 SOLUTION/ DROPS OPHTHALMIC at 10:03

## 2025-03-25 RX ADMIN — MIDAZOLAM HYDROCHLORIDE 2 MG: 2 SYRUP ORAL at 11:03

## 2025-03-25 NOTE — TRANSFER OF CARE
Anesthesia Transfer of Care Note    Patient: Colt Moreno    Procedure(s) Performed: Procedure(s) (LRB):  PHACOEMULSIFICATION, CATARACT, WITH IOL INSERTION (Left)    Patient location: Other: OR    Anesthesia Type: MAC    Transport from OR: Transported from OR on room air with adequate spontaneous ventilation    Post pain: adequate analgesia    Post assessment: no apparent anesthetic complications    Post vital signs: stable    Level of consciousness: awake    Nausea/Vomiting: no nausea/vomiting    Complications: none    Transfer of care protocol was followed      Last vitals:   166/87  16 RR  37 C TEMP  57 HR  100% O2 SAT

## 2025-03-25 NOTE — DISCHARGE INSTRUCTIONS
-RELAX AT HOME FOR THE REST OF THE DAY. YOU MAY EAT ANYTHING YOU LIKE.   -PLAN TO SEE DR TAPIA TOMORROW AT THE OFFICE. BRING ALL EYE DROPS WITH YOU TO THIS APPOINTMENT.    -PUT EYE DROPS IN THE AFFECTED EYE AS PER DR TAPIA'S EYE DROP SCHEDULED. -USE IN ANY ORDER, WAIT 5 MINUTES BETWEEN DROPS.  -REMOVE EYE SHIELD WHILE PUTTING EYE DROPS IN.    -DO NOT RUB YOUR EYE!!  -DO NOT EXERT YOURSELF - NO HEAVY LIFTING, RUNNING OR SWIMMING FOR 1 WEEK.  -YOU MAY SHOWER, BUT DON'T ALLOW WATER INTO YOUR EYE FOR 1 WEEK.  -WEAR PROTECTIVE SUNGLASSES DURING THE DAY AND AN EYE SHIELD AT NIGHT FOR 1 WEEK.    NO DRINKING ALCOHOL OR DRIVING FOR 24 HOURS.    -IF YOU HAVE PAIN, REDNESS OR DECREASED VISION, CALL DR TAPIA'S OFFICE [phone] OR IF AFTER HOURS CALL [phone].

## 2025-03-25 NOTE — OP NOTE
[facility]  Operative Note    Date:  3/25/2025    Patient:  Colt Moreno  MRN:  [id number]   :  1940    Surgeon:  Jimy Lucero MD    PREOPERATIVE DIAGNOSIS:  Visually significant age-related nuclear, cortical, and posterior subcapsular cataract, left eye.    POSTOPERATIVE DIAGNOSIS:  Visually significant age-related nuclear, cortical, and posterior subcapsular cataract, left eye.    PROCEDURE:  Phacoemulsification of cataract with posterior chamber intraocular lens implant, left eye.    ANESTHESIA:  Topical with MAC.      COMPLICATIONS:  None.    ESTIMATED BLOOD LOSS:  Minimal.    PROCEDURE IN DETAIL:  The patient presented to our clinic complaining of increasing difficulties with activities of daily living due to a visually significant cataract in the left eye.  After risks, benefits, and alternatives were explained to the patient, the patient agreed to proceed with the above procedure.  The patient was brought to the operating room and received topical anesthetic to the left eye and was then prepped and draped in the usual sterile fashion.  The left eye was first entered at 5:00 o'clock paracentesis site followed by intracameral lidocaine, and Viscoat.  The primary surgical site was then created at 2:30 o'clock followed by continuous capsulotomy, hydrodissection, and phacoemulsification of the cataract.  Residual cortical material was removed using the automated irrigation-aspiration technique.  Provisc was injected into the posterior chamber and an Teofilo model SY60WF 20.5 diopter lens was placed in the bag without difficulty.  Residual Provisc was removed using the automated irrigation-aspiration technique.  The eye was re-pressurized using BSS solution, and both the paracentesis and primary surgical site were demonstrated to be watertight at the end of the case with Weck-Josefina manipulation.  Vigamox, Pred Forte, and Prolensa were placed in the eye followed by placement of a [place] shield.  The  patient tolerated the procedure well and was taken to the recovery room in good and stable condition.  The patient was instructed to refrain from any heavy lifting, bending, stooping, or straining activities and to follow-up in the morning for routine post-operative care with Dr. Jimy Lucero.

## 2025-03-25 NOTE — DISCHARGE SUMMARY
[facility]  Brief Discharge Note    Patient Name:  Colt Moreno   MRN:  [id number]    :  1940   Admission Date:  3/25/2025   Discharge Date:  3/25/2025   Attending Physician: Jimy Lucero MD     Procedure:  PHACOEMULSIFICATION, CATARACT (Left)    OUTCOME: Patient tolerated procedure well without complication and is now ready for discharge.    DISPOSITION: Home or Self Care    FINAL DIAGNOSIS:  Age-related nuclear cataract, left eye                                     Age-related cortical cataract, left eye                                     Age-related posterior subcapsular cataract, left eye    FOLLOWUP: 1 day in clinic    DISCHARGE INSTRUCTIONS:  Wear eye shield until your schedule appointment with doctor.                                                       Only remove eye shield to apply eye drops.                                                       Follow the post-op eye instructions given from the clinic.

## 2025-03-26 NOTE — ANESTHESIA POSTPROCEDURE EVALUATION
Anesthesia Post Evaluation    Patient: Colt Moreno    Procedure(s) Performed: Procedure(s) (LRB):  PHACOEMULSIFICATION, CATARACT, WITH IOL INSERTION (Left)    Final Anesthesia Type: MAC      Patient location during evaluation: OPS  Patient participation: Yes- Able to Participate  Level of consciousness: awake  Post-procedure vital signs: reviewed and stable  Pain management: adequate  Airway patency: patent    PONV status at discharge: No PONV  Anesthetic complications: no      Cardiovascular status: blood pressure returned to baseline  Respiratory status: spontaneous ventilation  Hydration status: euvolemic  Follow-up not needed.              Vitals Value Taken Time   /84 03/25/25 12:22   Temp 36.4 °C (97.6 °F) 03/25/25 12:22   Pulse 63 03/25/25 12:22   Resp 18 03/25/25 12:22   SpO2 100 % 03/25/25 12:22         No case tracking events are documented in the log.      Pain/Petrona Score: Petrona Score: 10 (3/25/2025 12:28 PM)

## 2025-03-27 VITALS
TEMPERATURE: 98 F | WEIGHT: 141 LBS | HEART RATE: 63 BPM | HEIGHT: 68 IN | DIASTOLIC BLOOD PRESSURE: 84 MMHG | SYSTOLIC BLOOD PRESSURE: 174 MMHG | BODY MASS INDEX: 21.37 KG/M2 | RESPIRATION RATE: 18 BRPM | OXYGEN SATURATION: 100 %

## 2025-06-10 NOTE — PROGRESS NOTES
PMR New Patient History and Physical    Referred by: Radha Maldonado MD    06/10/2025    Branden Moreno is a 84 y.o. male with past medical history squamous cell carcinoma of the tongue status post chemo and radiation completed in 2012 who presents with complaint of neck pain.     Onset/ Duration: 6 months, came on with working   Progression: Sometimes gets worse  Pain described as:  Aching pain  Severity: 8/10 at worst, if not working 0/10  Location: Gets it in the middle of the neck on the sides of the neck, when he bends over  Radiation:  None  Aggravators: bending forward  Relievers:  Not moving  Numbness/ tingling: no  Weakness: no    Treatments tried and response  Therapy: None  Medications: Not taking any medication for this, does not like to take medications    No bowel or bladder incontinence, no difficulty with fine motor skills  Reports he did not take his blood pressure medicine this morning, does not take it every day, is not having any symptoms of headaches, visual changes, chest pain    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    Past Medical History:   Diagnosis Date    Cancer     Glaucoma     History of chemotherapy     History of therapeutic radiation     Hypertension     Pterygium eye, right     Squamous cell carcinoma of tongue        Past Surgical History:   Procedure Laterality Date    COLONOSCOPY      EXCISION OF PTERYGIUM Right 05/03/2022    Procedure: EXCISION, PTERYGIUM;  Surgeon: Jimy Lucero MD;  Location: [facility];  Service: Ophthalmology;  Laterality: Right;  7th - 0830    PHACOEMULSIFICATION, CATARACT, WITH IOL INSERTION Left 3/25/2025    Procedure: PHACOEMULSIFICATION, CATARACT, WITH IOL INSERTION;  Surgeon: Jimy Lucero MD;  Location: [facility];  Service: Ophthalmology;  Laterality: Left;  8th - 1000    TOTAL KNEE ARTHROPLASTY  10/2018       Current Medications[1]    Review of patient's allergies indicates:  No Known  Allergies    Social History     Socioeconomic History    Marital status:    Tobacco Use    Smoking status: Never    Smokeless tobacco: Never   Substance and Sexual Activity    Alcohol use: Not Currently    Drug use: Never    Sexual activity: Yes     Partners: Female     Social Drivers of Health     Financial Resource Strain: Low Risk  (12/5/2024)    Overall Financial Resource Strain (CARDIA)     Difficulty of Paying Living Expenses: Not very hard   Food Insecurity: No Food Insecurity (12/5/2024)    Hunger Vital Sign     Worried About Running Out of Food in the Last Year: Never true     Ran Out of Food in the Last Year: Never true   Physical Activity: Insufficiently Active (12/5/2024)    Exercise Vital Sign     Days of Exercise per Week: 5 days     Minutes of Exercise per Session: 20 min   Stress: No Stress Concern Present (12/5/2024)    [employer] - Occupational Stress Questionnaire     Feeling of Stress : Only a little   Housing Stability: Unknown (12/5/2024)    Housing Stability Vital Sign     Unable to Pay for Housing in the Last Year: No       Family history reviewed.     Review of Systems:  Negative except as noted in HPI    Objective  Vitals:    06/11/25 0935   BP: (!) 215/91   Pulse: (!) 55       General: NAD, cooperative to exam  HEENT:  Normocephalic and atraumatic  Respiratory:  Nonlabored breathing, no respiratory distress  Cardiovascular:  No cyanosis or edema  Abdomen: benign  Skin: no lacerations or abrasions to exposed areas  Neurologic Exam: awake, alert and appropriate  Motor:   Upper Extremity Left Right   Shoulder abduction 5 5   Elbow flexion 5 5   Elbow extension 5 5   Finger flexion 5 5   Finger abduction 5 5     Tone: normal  Bulk: normal without diffuse atrophy or hypertrophy  Involuntary movements: none    MSK:  Inspection: no redness or erythema to the cervical spine.  There is scar tissue in the anterior part of the neck  Palpation:  Nontender to  palpation along spinous processes, paraspinal musculature  ROM:  Full flexion, mildly limited extension, full lateral rotation    Pertinent Labs:  03/07/2025:  CRP 53, ESR 60  12/12/2024:  Creatinine 1.3    Imaging:  Prior imaging: reviewed by me. Findings CT soft tissue neck with contrast 12/12/2024:  Significant degenerative changes throughout the cervical spine, worse at C5-6 with moderate canal stenosis and grade 1 retrolisthesis of C5 on C4  Today:  None    Notes Reviewed:  Note from ENT 01/06/2025: Seen for surveillance, doing well from cancer, referred to spine clinic for neck pain     Procedure:  No procedure performed during this visit.     Assessment and Plan:  Colt was seen today for consult.    Diagnoses and all orders for this visit:    Chronic neck pain    History of oropharyngeal cancer  -     Ambulatory referral/consult to Physical Medicine Rehab          Colt Moreno is a 84 y.o. male who presents to clinic today for evaluation and management of 6 months of axial neck pain, overall symptoms are manageable.   - his blood pressure is very high today, he did not take his blood pressure medicine this morning, does not take it regularly.  Does not have any headaches, visual changes, chest pain that are concerning for hypertensive emergency.  Encouraged him to take his blood pressure regularly, take his blood pressure medication regularly, and follow up with PCP.  Discussed that chronic hypertension can put him at risk for stroke and other medical problems  - reviewed imaging with patient, discussed etiology of pain as likely degenerative.  - recommend taking occasional Tylenol as needed for pain, also discussed Voltaren gel as needed.  He does not like taking medications regularly  - provided some exercises he can do to maintain neck strength and range of motion to help with pain  - he isn't interested in physical therapy, injections at this time which I think is reasonable  - monitor for symptoms of  numbness or tingling in the arms, legs, bowel or bladder changes, balance problems, weakness in the arms or legs    Follow up on or around:  6 months, follow up neck pain, consider physical therapy if not improving    Bethanie CHURCH Amira  06/10/2025  [facility]                 [1]   Current Outpatient Medications:     amLODIPine (NORVASC) 10 MG tablet, Take 10 mg by mouth once daily., Disp: , Rfl:     atorvastatin (LIPITOR) 40 MG tablet, Take 40 mg by mouth once daily., Disp: , Rfl:     latanoprost 0.005 % ophthalmic solution, Place 1 drop into both eyes every evening., Disp: , Rfl:     levothyroxine (SYNTHROID) 50 MCG tablet, Take 50 mcg by mouth before breakfast., Disp: , Rfl:     multivitamin capsule, Take 1 capsule by mouth once daily., Disp: , Rfl:   No current facility-administered medications for this visit.    Facility-Administered Medications Ordered in Other Visits:     ceFAZolin (ANCEF) 100 mg in sterile water for injection 0.5 mL subconjunctival soln (conc: 100 mg/0.5 mL), 100 mg, Subconjunctival, Once, Jimy Lucero MD    dexamethasone injection 2 mg, 2 mg, Other, Once, Jimy Lucero MD    LIDOcaine (PF) 10 mg/ml (1%) injection 10 mg, 1 mL, Intradermal, Once, Jimy Lucero MD    mitoMYcin Kit 0.2 mg, 0.2 mg, Right Eye, Once, Jimy Lucero MD    neomycin-polymyxin-dexamethasone ophthalmic ointment 1 application, 1 application , Right Eye, Once, Jimy Lucero MD    prednisoLONE acetate 1 % ophthalmic suspension 1 drop, 1 drop, Right Eye, Once, Jimy Lucero MD    proparacaine 0.5 % ophthalmic solution 1 drop, 1 drop, Right Eye, Q5 Min, Jimy Lucero MD, 1 drop at 05/03/22 0908

## 2025-06-11 ENCOUNTER — TELEPHONE (OUTPATIENT)
Dept: HEMATOLOGY/ONCOLOGY | Facility: CLINIC | Age: 85
End: 2025-06-11
Payer: MEDICARE

## 2025-06-11 ENCOUNTER — OFFICE VISIT (OUTPATIENT)
Dept: PHYSICAL MEDICINE AND REHAB | Facility: CLINIC | Age: 85
End: 2025-06-11
Payer: MEDICARE

## 2025-06-11 VITALS
SYSTOLIC BLOOD PRESSURE: 224 MMHG | BODY MASS INDEX: 21.22 KG/M2 | DIASTOLIC BLOOD PRESSURE: 124 MMHG | WEIGHT: 140 LBS | HEIGHT: 68 IN | HEART RATE: 56 BPM

## 2025-06-11 DIAGNOSIS — M54.2 CHRONIC NECK PAIN: Primary | ICD-10-CM

## 2025-06-11 DIAGNOSIS — G89.29 CHRONIC NECK PAIN: Primary | ICD-10-CM

## 2025-06-11 DIAGNOSIS — Z85.819 HISTORY OF OROPHARYNGEAL CANCER: ICD-10-CM

## 2025-06-11 PROCEDURE — 99214 OFFICE O/P EST MOD 30 MIN: CPT | Mod: PBBFAC | Performed by: STUDENT IN AN ORGANIZED HEALTH CARE EDUCATION/TRAINING PROGRAM

## 2025-06-11 PROCEDURE — 99204 OFFICE O/P NEW MOD 45 MIN: CPT | Mod: S$PBB,,, | Performed by: STUDENT IN AN ORGANIZED HEALTH CARE EDUCATION/TRAINING PROGRAM

## 2025-06-11 PROCEDURE — 99999 PR PBB SHADOW E&M-EST. PATIENT-LVL IV: CPT | Mod: PBBFAC,,, | Performed by: STUDENT IN AN ORGANIZED HEALTH CARE EDUCATION/TRAINING PROGRAM

## 2025-08-21 ENCOUNTER — TELEPHONE (OUTPATIENT)
Dept: HEMATOLOGY/ONCOLOGY | Facility: CLINIC | Age: 85
End: 2025-08-21
Payer: MEDICARE

## (undated) DEVICE — CORD BIPOLAR SINGLE USE

## (undated) DEVICE — KIT TISSEEL FIBRIN SEALANT 2ML

## (undated) DEVICE — DRAPE OPTHALMIC W/POUCH

## (undated) DEVICE — LABEL BARKLEY 9/16X1-7/8IN

## (undated) DEVICE — GOWN SURGICAL BRTHBL XL

## (undated) DEVICE — WATER IRR STRL USP PLAS 250ML

## (undated) DEVICE — GLOVE BIOGEL ECLIPSE SZ 7.5

## (undated) DEVICE — SKIN MARKER STER DUAL TIP

## (undated) DEVICE — CANNULA SUPERSOFT CO2 M AD 7FT

## (undated) DEVICE — GAUZE SPONGE 4X4 12PLY

## (undated) DEVICE — SPEAR EYE WECK-CEL CELLULOSE

## (undated) DEVICE — NDL 18GA

## (undated) DEVICE — GOWN SURGICAL BRTHBL XL XLNG

## (undated) DEVICE — SOL IRRI STRL WATER 1000ML

## (undated) DEVICE — SHEILD EYE PROTCT BLUE FLEX

## (undated) DEVICE — HEMOSTATIC ERASER 18GA CRVD

## (undated) DEVICE — PAD EYE STERILE 1-5/8X2-5/8IN

## (undated) DEVICE — SYR 3CC LUER LOC

## (undated) DEVICE — SPONGE COTTON TRAY 4X4IN

## (undated) DEVICE — NDL ONLY TB 25G X 5/8CA

## (undated) DEVICE — KIT CUSTOM BASIC EYE ST / MEA

## (undated) DEVICE — ELECTRODE FOAM 535 TEARDROP

## (undated) DEVICE — GLOVE SENSICARE PI SURG 6.5

## (undated) DEVICE — NDL HYPO ECLIP 27GX1/2 LL SAF

## (undated) DEVICE — UNDERGLOVES BIOGEL PI SZ 7 LF